# Patient Record
Sex: FEMALE | Race: WHITE | NOT HISPANIC OR LATINO | Employment: OTHER | URBAN - NONMETROPOLITAN AREA
[De-identification: names, ages, dates, MRNs, and addresses within clinical notes are randomized per-mention and may not be internally consistent; named-entity substitution may affect disease eponyms.]

---

## 2018-01-15 ENCOUNTER — OFFICE VISIT (OUTPATIENT)
Dept: PULMONOLOGY | Facility: CLINIC | Age: 48
End: 2018-01-15

## 2018-01-15 ENCOUNTER — HOSPITAL ENCOUNTER (OUTPATIENT)
Dept: PULMONOLOGY | Facility: HOSPITAL | Age: 48
Discharge: HOME | End: 2018-01-15
Attending: INTERNAL MEDICINE | Admitting: INTERNAL MEDICINE

## 2018-01-15 ENCOUNTER — LAB (OUTPATIENT)
Dept: LAB | Facility: HOSPITAL | Age: 48
End: 2018-01-15
Attending: INTERNAL MEDICINE

## 2018-01-15 VITALS
WEIGHT: 247 LBS | DIASTOLIC BLOOD PRESSURE: 82 MMHG | HEART RATE: 68 BPM | RESPIRATION RATE: 18 BRPM | SYSTOLIC BLOOD PRESSURE: 124 MMHG | OXYGEN SATURATION: 94 % | HEIGHT: 62 IN | BODY MASS INDEX: 45.45 KG/M2

## 2018-01-15 DIAGNOSIS — R06.83 SNORING: ICD-10-CM

## 2018-01-15 DIAGNOSIS — J30.89 OTHER ALLERGIC RHINITIS: ICD-10-CM

## 2018-01-15 DIAGNOSIS — G47.19 EXCESSIVE DAYTIME SLEEPINESS: ICD-10-CM

## 2018-01-15 DIAGNOSIS — G47.33 OBSTRUCTIVE SLEEP APNEA: ICD-10-CM

## 2018-01-15 DIAGNOSIS — R06.02 SHORTNESS OF BREATH: ICD-10-CM

## 2018-01-15 DIAGNOSIS — R05.3 COUGH, PERSISTENT: Primary | ICD-10-CM

## 2018-01-15 DIAGNOSIS — J45.50 SEVERE PERSISTENT ASTHMA WITHOUT COMPLICATION: ICD-10-CM

## 2018-01-15 PROCEDURE — 94060 EVALUATION OF WHEEZING: CPT

## 2018-01-15 PROCEDURE — 36415 COLL VENOUS BLD VENIPUNCTURE: CPT

## 2018-01-15 PROCEDURE — 94729 DIFFUSING CAPACITY: CPT | Performed by: INTERNAL MEDICINE

## 2018-01-15 PROCEDURE — 94060 EVALUATION OF WHEEZING: CPT | Performed by: INTERNAL MEDICINE

## 2018-01-15 PROCEDURE — 94640 AIRWAY INHALATION TREATMENT: CPT

## 2018-01-15 PROCEDURE — 94729 DIFFUSING CAPACITY: CPT

## 2018-01-15 PROCEDURE — 94726 PLETHYSMOGRAPHY LUNG VOLUMES: CPT | Performed by: INTERNAL MEDICINE

## 2018-01-15 PROCEDURE — 99245 OFF/OP CONSLTJ NEW/EST HI 55: CPT | Performed by: INTERNAL MEDICINE

## 2018-01-15 PROCEDURE — 94726 PLETHYSMOGRAPHY LUNG VOLUMES: CPT

## 2018-01-15 RX ORDER — FLUNISOLIDE 0.25 MG/ML
1 SOLUTION NASAL EVERY 12 HOURS
Qty: 1 BOTTLE | Refills: 5 | Status: SHIPPED | OUTPATIENT
Start: 2018-01-15 | End: 2018-07-16 | Stop reason: SDUPTHER

## 2018-01-15 RX ORDER — ESTRADIOL 0.05 MG/D
FILM, EXTENDED RELEASE TRANSDERMAL
Refills: 2 | COMMUNITY
Start: 2017-10-20

## 2018-01-15 RX ORDER — BROMPHENIRAMINE MALEATE, PSEUDOEPHEDRINE HYDROCHLORIDE, AND DEXTROMETHORPHAN HYDROBROMIDE 2; 30; 10 MG/5ML; MG/5ML; MG/5ML
SYRUP ORAL
Refills: 0 | COMMUNITY
Start: 2017-10-12 | End: 2019-06-13

## 2018-01-15 RX ORDER — FLUCONAZOLE 150 MG/1
150 TABLET ORAL TAKE AS DIRECTED
Refills: 0 | COMMUNITY
Start: 2017-12-11

## 2018-01-15 RX ORDER — DEXAMETHASONE 4 MG/1
TABLET ORAL
Refills: 0 | COMMUNITY
Start: 2017-10-12 | End: 2018-01-15

## 2018-01-15 RX ORDER — METHYLPREDNISOLONE 4 MG/1
TABLET ORAL
Refills: 0 | COMMUNITY
Start: 2017-10-12 | End: 2019-06-13

## 2018-01-15 RX ORDER — ALBUTEROL SULFATE 90 UG/1
AEROSOL, METERED RESPIRATORY (INHALATION)
Refills: 0 | COMMUNITY
Start: 2017-10-12 | End: 2018-07-16 | Stop reason: SDUPTHER

## 2018-01-15 RX ORDER — NYSTATIN 100000 [USP'U]/G
POWDER TOPICAL
Refills: 0 | COMMUNITY
Start: 2017-12-11 | End: 2019-06-13

## 2018-01-15 RX ORDER — ALBUTEROL SULFATE 2.5 MG/3ML
2.5 SOLUTION RESPIRATORY (INHALATION) ONCE
Status: COMPLETED | OUTPATIENT
Start: 2018-01-15 | End: 2018-01-15

## 2018-01-15 RX ORDER — TOBRAMYCIN AND DEXAMETHASONE 3; 1 MG/ML; MG/ML
SUSPENSION/ DROPS OPHTHALMIC
Refills: 0 | COMMUNITY
Start: 2017-10-11 | End: 2019-06-13

## 2018-01-15 RX ADMIN — ALBUTEROL SULFATE 2.5 MG: 2.5 SOLUTION RESPIRATORY (INHALATION) at 11:35

## 2018-01-15 NOTE — PROGRESS NOTES
CONSULT NOTE    Requested by:   JEANETTE Dickerson      Chief Complaint   Patient presents with   • Consult   • Cough   • Shortness of Breath       Subjective   Madalyn Hernandez is a 47 y.o. female.     History of Present Illness   Patient comes in today for consultation because of chronic cough for the past 2 years.  The patient says that about 2-1/2 years ago or so she developed an episode of pneumonia and after that she developed a cough which has persisted since then.    The patient says that although she is a nonsmoker herself, her parents smoked throughout her childhood.  She also reports significant improvement in symptoms when she is placed on steroids for a short time.  The patient says that cold definitely worsens her symptoms and also strong smells worse in it.    She has a dog at home but denies any allergies to it and actually has had that same doctor more than 40 years now.    The patient uses Ventolin once or twice a day and feels that they help.    Patient also complains of runny nose and dribbling in the back of the throat for the past few weeks. This has been sometimes associated with seasonal variation.    The patient also reports joint aches in the morning and occasional facial rash although there seems to be no relationship with exposure to the sun.    The patient is complaining of daytime sleepiness, tiredness and fatigue.  She is also complaining of occasional headaches in the morning.  The patient says that she has a tendency to fall asleep while watching TV.  She has been told by her friend's mother that her symptoms were consistent with sleep apnea as the friend's mother also had sleep apnea and when she stayed with the patient, she felt that her symptoms were highly suggestive of it.    She reports positive family history of COPD in her aunts and grandfather.    The following portions of the patient's history were reviewed and updated as appropriate: allergies, current medications, past  "family history, past medical history, past social history, past surgical history and problem list.    Review of Systems   Constitutional: Positive for fatigue. Negative for chills and fever.   HENT: Positive for postnasal drip and rhinorrhea. Negative for sinus pain, sinus pressure, sneezing and sore throat.    Respiratory: Positive for cough, chest tightness, shortness of breath and wheezing.    Cardiovascular: Positive for chest pain and leg swelling. Negative for palpitations.   Psychiatric/Behavioral: Positive for sleep disturbance.   All other systems reviewed and are negative.      History reviewed. No pertinent past medical history.    Social History   Substance Use Topics   • Smoking status: Never Smoker   • Smokeless tobacco: Never Used   • Alcohol use No         Objective   Visit Vitals   • /82   • Pulse 68   • Resp 18   • Ht 157.5 cm (62\")   • Wt 112 kg (247 lb)   • SpO2 94%   • BMI 45.18 kg/m2       Physical Exam   Constitutional: She is oriented to person, place, and time. She appears well-developed and well-nourished.   HENT:   Head: Atraumatic.   Sinuses were non tender on palpation.  Nostril showed moderate erythema.  Oropharynx was cobblestoned   Dentures  Paranasal rash noted   Eyes: EOM are normal. Pupils are equal, round, and reactive to light.   Neck: No JVD present. No tracheal deviation present. No thyromegaly present.   Increased adipose tissue.    Cardiovascular: Normal rate and regular rhythm.    Pulmonary/Chest: Effort normal and breath sounds normal. No respiratory distress. She has no wheezes.   Musculoskeletal: Normal range of motion. She exhibits no edema.   Neurological: She is alert and oriented to person, place, and time.   Skin: Skin is warm and dry.   Psychiatric: She has a normal mood and affect. Her behavior is normal.   Vitals reviewed.        Assessment/Plan   Madalyn was seen today for consult, cough and shortness of breath.    Diagnoses and all orders for this " visit:    Cough, persistent    Severe persistent asthma without complication  -     Pulmonary Function Test    Shortness of breath  -     Pulmonary Function Test  -     Nitric Oxide  -     Pulmonary Function Test    Obstructive sleep apnea    Excessive daytime sleepiness    Snoring    Other allergic rhinitis  -     IgE; Future  -     Allergens, Zone 8; Future    Other orders  -     mometasone-formoterol (DULERA 200) 200-5 MCG/ACT inhaler; Inhale 2 puffs 2 (Two) Times a Day. Rinse mouth with water after use.  -     flunisolide (NASALIDE) 25 MCG/ACT (0.025%) solution nasal spray; Inhale 1 spray Every 12 (Twelve) Hours.         Return in about 6 weeks (around 2/26/2018) for Recheck, Labs, Give pt sleep questionairosiel, For Rasheeda.    DISCUSSION(if any):  I have reviewed the patient's imaging studies and shared them with her.  Her last chest x-ray from 2015 did not show any acute pulmonary disease.    I have also reviewed her primary care provider's office note that mentions chronic cough.  It also mentions wheezing and nonsmoking status.  It also significantly mentions relief with steroids.     Her FeNO was 9.    ===========================  ===========================    I had a detailed discussion with the patient regarding her symptoms which are very suggestive of cough variant asthma.    She was started on Dulera 2 puffs twice daily with instructions to rinse his mouth after water    The patient may be started on Singulair    Patient will be started on nasal spray for symptoms which are definitely consistent with allergic rhinitis.     I have ordered IgE/RAST panel.    Other contributing factors may also need to be treated and this will be discussed with the patient, if and when applicable    The patient was advised to maintain a log of the use of rescue inhaler    She was also advised to keep a close eye on peak flow readings and to maintain record of any significant changes in it.    Sleep questionnaire was  provided to the patient    The pathophysiology of sleep apnea was discussed, with the patient.     Based on the sleep questionnaire, I will definitely consider ordering a sleep study.    Patient was educated on good sleep hygiene measures and voiced understanding of the same.     Patient was given reading material.        Dictated utilizing Dragon dictation.    This document was electronically signed by Gianluca Bishop MD January 15, 2018  10:28 AM

## 2018-03-12 ENCOUNTER — OFFICE VISIT (OUTPATIENT)
Dept: PULMONOLOGY | Facility: CLINIC | Age: 48
End: 2018-03-12

## 2018-03-12 VITALS
BODY MASS INDEX: 46.01 KG/M2 | HEART RATE: 77 BPM | OXYGEN SATURATION: 95 % | SYSTOLIC BLOOD PRESSURE: 115 MMHG | HEIGHT: 62 IN | RESPIRATION RATE: 17 BRPM | DIASTOLIC BLOOD PRESSURE: 80 MMHG | WEIGHT: 250 LBS

## 2018-03-12 DIAGNOSIS — R06.02 SHORTNESS OF BREATH: ICD-10-CM

## 2018-03-12 DIAGNOSIS — G47.33 OBSTRUCTIVE SLEEP APNEA: ICD-10-CM

## 2018-03-12 DIAGNOSIS — J30.89 OTHER ALLERGIC RHINITIS: ICD-10-CM

## 2018-03-12 DIAGNOSIS — R05.3 COUGH, PERSISTENT: Primary | ICD-10-CM

## 2018-03-12 DIAGNOSIS — J45.50 SEVERE PERSISTENT ASTHMA WITHOUT COMPLICATION: ICD-10-CM

## 2018-03-12 PROCEDURE — 99214 OFFICE O/P EST MOD 30 MIN: CPT | Performed by: NURSE PRACTITIONER

## 2018-03-12 RX ORDER — MONTELUKAST SODIUM 10 MG/1
10 TABLET ORAL NIGHTLY
Qty: 30 TABLET | Refills: 5 | Status: SHIPPED | OUTPATIENT
Start: 2018-03-12 | End: 2018-07-16 | Stop reason: SDUPTHER

## 2018-03-12 NOTE — PROGRESS NOTES
"Chief Complaint   Patient presents with   • Follow-up     Cough, persistent         Subjective   Madalyn Hernandez is a 47 y.o. female.     History of Present Illness   The patient comes in today for follow-up of shortness of breath, persistent cough, and obstructive sleep apnea.    She has been using Dulera twice a day and she feels like this medication works better than the previous medication.  She is using her rescue inhaler 2-3 times a day depending on her activity.    She does report having one asthma attack when she was just simply sitting the other day and this is the first time that has ever happened.  She used her rescue inhaler and symptoms resolved in about 45 minutes.    She did complete a sleep questionnaire but she left it at home she will try to get that back to us as soon as possible.  She does complain of awakening multiple times in the middle of the night and also having trouble going to sleep at times.  She states she did not sleep at all last night.    She attempted to have her lab work completed however the technician was not able to get enough blood.  The patient will return in a second attempt to have her blood work completed.     She is using Nasalide once a day and is still complaining of postnasal drainage.      The following portions of the patient's history were reviewed and updated as appropriate: allergies, current medications, past family history, past medical history, past social history and past surgical history.    Review of Systems   Constitutional: Positive for fatigue. Negative for chills and fever.   HENT: Positive for sneezing and voice change. Negative for rhinorrhea and sore throat.    Respiratory: Positive for cough and shortness of breath. Negative for chest tightness and wheezing.        Objective   Visit Vitals  /80   Pulse 77   Resp 17   Ht 157.5 cm (62.01\")   Wt 113 kg (250 lb)   SpO2 95%   BMI 45.71 kg/m²     Physical Exam   Constitutional: She is oriented to " person, place, and time. She appears well-developed and well-nourished.   HENT:   Head: Normocephalic and atraumatic.   Crowded oropharynx.    Eyes: EOM are normal.   Neck: Neck supple.   Cardiovascular: Normal rate and regular rhythm.    Pulmonary/Chest: Effort normal and breath sounds normal. No respiratory distress. She has no wheezes.   Musculoskeletal: She exhibits no edema.   Neurological: She is alert and oriented to person, place, and time.   Skin: Skin is warm and dry.   Psychiatric: She has a normal mood and affect.   Vitals reviewed.          Assessment/Plan   Madalyn was seen today for follow-up.    Diagnoses and all orders for this visit:    Cough, persistent    Severe persistent asthma without complication  -     IgE; Future  -     Allergens, Zone 8; Future    Shortness of breath    Obstructive sleep apnea    Other allergic rhinitis    Other orders  -     montelukast (SINGULAIR) 10 MG tablet; Take 1 tablet by mouth Every Night.           Return in about 4 months (around 7/12/2018) for Recheck, For Dr. Bishop.    DISCUSSION (if any):  I will reorder the labs for IgE/RAST.    She should continue Dulera twice a day.  I have added Singulair.    She should try to use Nasalide twice a day if it is tolerated.    I have asked her to milliliter drop off the sleep questionnaire when she is able.     I reviewed her PFT from January which shows no obstruction, no restriction or air trapping suggested and a mild decreased diffusion capacity.       Dictated utilizing Dragon dictation.    This document was electronically signed by SHERYL Moseley March 12, 2018  2:04 PM

## 2018-07-10 ENCOUNTER — LAB (OUTPATIENT)
Dept: LAB | Facility: HOSPITAL | Age: 48
End: 2018-07-10

## 2018-07-10 DIAGNOSIS — J45.50 SEVERE PERSISTENT ASTHMA WITHOUT COMPLICATION: ICD-10-CM

## 2018-07-10 PROCEDURE — 82785 ASSAY OF IGE: CPT

## 2018-07-10 PROCEDURE — 86003 ALLG SPEC IGE CRUDE XTRC EA: CPT

## 2018-07-10 PROCEDURE — 36415 COLL VENOUS BLD VENIPUNCTURE: CPT

## 2018-07-12 LAB — TOTAL IGE SMQN RAST: 25 IU/ML (ref 0–100)

## 2018-07-16 ENCOUNTER — OFFICE VISIT (OUTPATIENT)
Dept: PULMONOLOGY | Facility: CLINIC | Age: 48
End: 2018-07-16

## 2018-07-16 VITALS
OXYGEN SATURATION: 98 % | WEIGHT: 250 LBS | BODY MASS INDEX: 46.01 KG/M2 | HEART RATE: 77 BPM | RESPIRATION RATE: 16 BRPM | HEIGHT: 62 IN | DIASTOLIC BLOOD PRESSURE: 76 MMHG | SYSTOLIC BLOOD PRESSURE: 128 MMHG

## 2018-07-16 DIAGNOSIS — G47.33 OBSTRUCTIVE SLEEP APNEA: ICD-10-CM

## 2018-07-16 DIAGNOSIS — R06.83 SNORING: ICD-10-CM

## 2018-07-16 DIAGNOSIS — R06.02 SHORTNESS OF BREATH: Primary | ICD-10-CM

## 2018-07-16 DIAGNOSIS — G47.19 EXCESSIVE DAYTIME SLEEPINESS: ICD-10-CM

## 2018-07-16 DIAGNOSIS — J30.89 OTHER ALLERGIC RHINITIS: ICD-10-CM

## 2018-07-16 DIAGNOSIS — E66.01 MORBID OBESITY, UNSPECIFIED OBESITY TYPE (HCC): ICD-10-CM

## 2018-07-16 DIAGNOSIS — K21.9 GASTROESOPHAGEAL REFLUX DISEASE, ESOPHAGITIS PRESENCE NOT SPECIFIED: ICD-10-CM

## 2018-07-16 DIAGNOSIS — J45.50 SEVERE PERSISTENT ASTHMA WITHOUT COMPLICATION: ICD-10-CM

## 2018-07-16 PROCEDURE — 99214 OFFICE O/P EST MOD 30 MIN: CPT | Performed by: INTERNAL MEDICINE

## 2018-07-16 PROCEDURE — 95012 NITRIC OXIDE EXP GAS DETER: CPT | Performed by: INTERNAL MEDICINE

## 2018-07-16 RX ORDER — FLUNISOLIDE 0.25 MG/ML
1 SOLUTION NASAL EVERY 12 HOURS
Qty: 1 BOTTLE | Refills: 5 | Status: SHIPPED | OUTPATIENT
Start: 2018-07-16 | End: 2019-05-07 | Stop reason: SDUPTHER

## 2018-07-16 RX ORDER — RANITIDINE 150 MG/1
150 TABLET ORAL 2 TIMES DAILY
Qty: 60 TABLET | Refills: 1 | Status: SHIPPED | OUTPATIENT
Start: 2018-07-16

## 2018-07-16 RX ORDER — ALBUTEROL SULFATE 90 UG/1
2 AEROSOL, METERED RESPIRATORY (INHALATION) EVERY 6 HOURS PRN
Qty: 1 INHALER | Refills: 5 | Status: SHIPPED | OUTPATIENT
Start: 2018-07-16 | End: 2019-05-07 | Stop reason: SDUPTHER

## 2018-07-16 RX ORDER — AZELASTINE 1 MG/ML
1 SPRAY, METERED NASAL 2 TIMES DAILY PRN
Qty: 1 EACH | Refills: 5 | Status: SHIPPED | OUTPATIENT
Start: 2018-07-16 | End: 2019-01-09 | Stop reason: SDUPTHER

## 2018-07-16 RX ORDER — MONTELUKAST SODIUM 10 MG/1
10 TABLET ORAL NIGHTLY
Qty: 30 TABLET | Refills: 5 | Status: SHIPPED | OUTPATIENT
Start: 2018-07-16 | End: 2019-01-09 | Stop reason: SDUPTHER

## 2018-07-16 NOTE — PROGRESS NOTES
"Chief Complaint   Patient presents with   • Follow-up     COUGH, PERSISTENT        Subjective   Madalyn Hernandez is a 47 y.o. female.     History of Present Illness   Patient comes in today to follow-up on asthma, allergic rhinitis and symptoms of daytime sleepiness and tiredness.    The patient says that she continues to have occasional symptoms of worsening asthma although she denied using Ventolin only twice a titration 2 weeks.    Despite using Nasalide, she's continuing to have runny nose and dribbling in the back of her throat.    The patient wakes up in the morning extremely tired.  The patient says that she snores loudly and wakes up multiple times at night gasping for breath.  She also reports dry mouth in the morning.    The patient says that despite sleeping 10-12 hours a night, she remains exhausted all day long.  She does have a tendency to fall asleep while watching TV or reading a book.    She denies any known family history of sleep apnea.  She drinks 6-7 cups of caffeinated drinks per day.    She is also complaining of very mild occasional heartburn.  She actually has a cough in the morning and the cough also wakes her up throughout the night on a regular basis.    The following portions of the patient's history were reviewed and updated as appropriate: allergies, current medications, past family history, past medical history, past social history and past surgical history.    Review of Systems   HENT: Negative for sinus pressure, sneezing and sore throat.    Respiratory: Positive for cough and shortness of breath. Negative for wheezing.        Objective   Visit Vitals  /76   Pulse 77   Resp 16   Ht 157.5 cm (62.01\")   Wt 113 kg (250 lb)   SpO2 98%   BMI 45.71 kg/m²       Physical Exam   Constitutional: She is oriented to person, place, and time. She appears well-developed and well-nourished.   HENT:   Dentures  Nostril showed moderate erythema.  Oropharynx was cobblestoned & crowded.   Eyes: EOM " are normal. Pupils are equal, round, and reactive to light.   Neck:   Increased adipose tissue.    Cardiovascular: Normal rate and regular rhythm.    Pulmonary/Chest: Effort normal and breath sounds normal. No respiratory distress. She has no wheezes. She has no rales.   Musculoskeletal:   Gait was normal.   Neurological: She is alert and oriented to person, place, and time.   Psychiatric: She has a normal mood and affect.   Vitals reviewed.        Assessment/Plan   Madalyn was seen today for follow-up.    Diagnoses and all orders for this visit:    Shortness of breath  -     Nitric Oxide  -     Peak Flow    Severe persistent asthma without complication  -     Nitric Oxide  -     Peak Flow    Other allergic rhinitis    Obstructive sleep apnea  -     Home Sleep Study; Future    Snoring  -     Home Sleep Study; Future    Excessive daytime sleepiness  -     Home Sleep Study; Future    Morbid obesity, unspecified obesity type (CMS/HCC)  -     Home Sleep Study; Future    Gastroesophageal reflux disease, esophagitis presence not specified    Other orders  -     raNITIdine (ZANTAC) 150 MG tablet; Take 1 tablet by mouth 2 (Two) Times a Day.  -     azelastine (ASTELIN) 0.1 % nasal spray; 1 spray into each nostril 2 (Two) Times a Day As Needed for Rhinitis or Allergies. Use in each nostril as directed  -     mometasone-formoterol (DULERA 200) 200-5 MCG/ACT inhaler; Inhale 2 puffs 2 (Two) Times a Day. Rinse mouth with water after use.  -     montelukast (SINGULAIR) 10 MG tablet; Take 1 tablet by mouth Every Night.  -     flunisolide (NASALIDE) 25 MCG/ACT (0.025%) solution nasal spray; Inhale 1 spray Every 12 (Twelve) Hours.  -     VENTOLIN  (90 Base) MCG/ACT inhaler; Inhale 2 puffs Every 6 (Six) Hours As Needed for Wheezing.         Return in about 3 months (around 10/16/2018) for Recheck, Sleep study, For Rasheeda.    DISCUSSION (if any):  FeNO level was 14 today.    Peak flow was 320 LPM.     IgE level was 25.  The RAST  panel is pending?    I've asked the patient to start using Nasalide twice a day, as her symptoms continue to suggest poorly controlled allergic rhinitis.    I will also have azelastine, for when necessary purposes.    We will consider CBC upon follow-up visit, to assess for eosinophil levels.    He was advised to continue all her current medications.    Refills were provided.    Patient was advised to use rescue inhaler for when necessary purposes    Patient was also advised to keep a log of the use of rescue inhaler.    The pathophysiology of sleep apnea was discussed, with the patient.     We will encourage the patient to schedule the sleep study soon.     The patient was made aware of the limitation of the home sleep study, whereby it may underestimate the true AHI and also carries a low sensitivity.  The patient was also told that even if the home sleep study is negative, we may suggest an in lab sleep study to completely and definitively rule out/in sleep apnea.  The patient has understood.  This was communicated to the patient, in case home study is to be requested.    The patient is agreeable to try CPAP/BiPAP, if needed.     Patient was educated on good sleep hygiene measures and voiced understanding of the same.     Patient was given reading material regarding sleep apnea    Patient was counseled regarding weight loss.     In her situation, the weight loss would also help her asthma symptoms.    Silent aspiration and acid reflux can also cause cough and given the fact that it is worse at night, have started her on Zantac 150 milligrams twice daily for the next 1 month on a trial basis.    Side effects were discussed.    Dictated utilizing Dragon dictation.    This document was electronically signed by Gianluca Bishop MD July 16, 2018  12:01 PM

## 2018-07-21 LAB
A ALTERNATA IGE QN: <0.1 KU/L
A FUMIGATUS IGE QN: <0.1 KU/L
AMER ROACH IGE QN: <0.1 KU/L
BAHIA GRASS IGE QN: <0.1 KU/L
BERMUDA GRASS IGE QN: <0.1 KU/L
BOXELDER IGE QN: <0.1 KU/L
C HERBARUM IGE QN: <0.1 KU/L
CAT DANDER IGG QN: <0.1 KU/L
CMN PIGWEED IGE QN: <0.1 KU/L
COMMON RAGWEED IGE QN: <0.1 KU/L
CONV CLASS DESCRIPTION: NORMAL
D FARINAE IGE QN: <0.1 KU/L
D PTERONYSS IGE QN: <0.1 KU/L
DOG DANDER IGE QN: <0.1 KU/L
ENGL PLANTAIN IGE QN: <0.1 KU/L
HAZELNUT POLN IGE QN: <0.1 KU/L
JOHNSON GRASS IGE QN: <0.1 KU/L
KENT BLUE GRASS IGE QN: <0.1 KU/L
M RACEMOSUS IGE QN: <0.1 KU/L
MT JUNIPER IGE QN: <0.1 KU/L
MUGWORT IGE QN: <0.1 KU/L
NETTLE IGE QN: <0.1 KU/L
P NOTATUM IGE QN: <0.1 KU/L
S BOTRYOSUM IGE QN: <0.1 KU/L
SHEEP SORREL IGE QN: <0.1 KU/L
SWEET GUM IGE QN: <0.1 KU/L
T011-IGE MAPLE LEAF SYCAMORE: <0.1 KU/L
WHITE ELM IGE QN: <0.1 KU/L
WHITE HICKORY IGE QN: <0.1 KU/L
WHITE MULBERRY IGE QN: <0.1 KU/L
WHITE OAK IGE QN: <0.1 KU/L

## 2018-07-30 ENCOUNTER — HOSPITAL ENCOUNTER (OUTPATIENT)
Dept: SLEEP MEDICINE | Facility: HOSPITAL | Age: 48
Setting detail: THERAPIES SERIES
End: 2018-07-30
Attending: INTERNAL MEDICINE

## 2018-07-30 DIAGNOSIS — R06.83 SNORING: ICD-10-CM

## 2018-07-30 DIAGNOSIS — G47.33 OBSTRUCTIVE SLEEP APNEA: ICD-10-CM

## 2018-07-30 DIAGNOSIS — G47.19 EXCESSIVE DAYTIME SLEEPINESS: ICD-10-CM

## 2018-07-30 DIAGNOSIS — E66.01 MORBID OBESITY, UNSPECIFIED OBESITY TYPE (HCC): ICD-10-CM

## 2018-07-30 PROCEDURE — 95806 SLEEP STUDY UNATT&RESP EFFT: CPT

## 2018-07-31 PROCEDURE — 95806 SLEEP STUDY UNATT&RESP EFFT: CPT | Performed by: INTERNAL MEDICINE

## 2018-08-01 DIAGNOSIS — G47.33 OSA (OBSTRUCTIVE SLEEP APNEA): Primary | ICD-10-CM

## 2018-09-10 RX ORDER — RANITIDINE 150 MG/1
TABLET ORAL
Qty: 60 TABLET | Refills: 0 | OUTPATIENT
Start: 2018-09-10

## 2018-10-16 ENCOUNTER — OFFICE VISIT (OUTPATIENT)
Dept: PULMONOLOGY | Facility: CLINIC | Age: 48
End: 2018-10-16

## 2018-10-16 VITALS
DIASTOLIC BLOOD PRESSURE: 68 MMHG | BODY MASS INDEX: 45.45 KG/M2 | SYSTOLIC BLOOD PRESSURE: 110 MMHG | OXYGEN SATURATION: 99 % | HEIGHT: 62 IN | RESPIRATION RATE: 18 BRPM | HEART RATE: 50 BPM | WEIGHT: 247 LBS

## 2018-10-16 DIAGNOSIS — R06.02 SHORTNESS OF BREATH: ICD-10-CM

## 2018-10-16 DIAGNOSIS — J45.50 SEVERE PERSISTENT ASTHMA WITHOUT COMPLICATION: ICD-10-CM

## 2018-10-16 DIAGNOSIS — J30.89 OTHER ALLERGIC RHINITIS: ICD-10-CM

## 2018-10-16 DIAGNOSIS — G47.33 OSA (OBSTRUCTIVE SLEEP APNEA): Primary | ICD-10-CM

## 2018-10-16 PROCEDURE — 99214 OFFICE O/P EST MOD 30 MIN: CPT | Performed by: NURSE PRACTITIONER

## 2018-10-16 RX ORDER — METRONIDAZOLE 10 MG/G
GEL TOPICAL
Refills: 2 | COMMUNITY
Start: 2018-08-28 | End: 2019-06-13

## 2018-10-16 RX ORDER — ORPHENADRINE CITRATE 100 MG/1
TABLET, EXTENDED RELEASE ORAL
Refills: 0 | COMMUNITY
Start: 2018-08-14 | End: 2019-06-13

## 2018-10-16 RX ORDER — PREDNISONE 20 MG/1
TABLET ORAL
Refills: 0 | COMMUNITY
Start: 2018-08-14 | End: 2019-06-13

## 2018-10-16 RX ORDER — INDOMETHACIN 25 MG/1
CAPSULE ORAL
Refills: 0 | COMMUNITY
Start: 2018-08-14 | End: 2019-06-13

## 2018-10-16 RX ORDER — SIMVASTATIN 10 MG
TABLET ORAL
Refills: 2 | COMMUNITY
Start: 2018-10-07

## 2018-10-16 RX ORDER — OXYBUTYNIN CHLORIDE 5 MG/1
5 TABLET, EXTENDED RELEASE ORAL DAILY
Refills: 2 | COMMUNITY
Start: 2018-09-26

## 2018-10-16 NOTE — PROGRESS NOTES
"Chief Complaint   Patient presents with   • Follow-up   • Sleeping Problem   • Shortness of Breath         Subjective   Madalyn Hernandez is a 48 y.o. female.     History of Present Illness   Patient comes in today to follow-up on asthma, sleep apnea and allergic rhinitis.    The patient says that her cough is noted in better and she coughs all day and all night.  It is occasionally productive of clear sputum but mostly it is extremely dry.  The patient occasionally has coughed to the point she vomited.    She is using Dulera twice a day and Singulair at night.    She does report burning in her chest and throat.  She is on ranitidine 150 mg twice a day.    The patient is using Nasalide twice a day and Astelin twice a day as well.  She reports only minimal improvement in her symptoms.    She actually denies any significant shortness of breath.    She says that she is using a CPAP device every night but is struggling with the mask which occasionally causes significant leak and discomfort throughout the night because of the leak.  She seems to have informed him he company of the same.    The following portions of the patient's history were reviewed and updated as appropriate: allergies, current medications, past family history, past medical history, past social history and past surgical history.    Review of Systems   Constitutional: Negative for chills and fever.   HENT: Positive for rhinorrhea and sore throat. Negative for sinus pressure.    Respiratory: Positive for cough and shortness of breath.    Psychiatric/Behavioral: Positive for sleep disturbance.       Objective   Visit Vitals  /68   Pulse 50   Resp 18   Ht 157.5 cm (62.01\")   Wt 112 kg (247 lb)   SpO2 99%   BMI 45.17 kg/m²     Physical Exam   Constitutional: She is oriented to person, place, and time. She appears well-developed and well-nourished.   HENT:   Head: Normocephalic and atraumatic.   Crowded oropharynx.   Eyes: EOM are normal.   Neck: Neck " supple.   Increased adipose tissue.   Cardiovascular: Normal rate and regular rhythm.    Pulmonary/Chest: Effort normal and breath sounds normal. No respiratory distress. She has no wheezes.   Musculoskeletal: She exhibits no edema.   Gait normal.   Neurological: She is alert and oriented to person, place, and time.   Skin: Skin is warm and dry.   Psychiatric: She has a normal mood and affect.   Vitals reviewed.          Assessment/Plan   Madalyn was seen today for follow-up, sleeping problem and shortness of breath.    Diagnoses and all orders for this visit:    DIANNA (obstructive sleep apnea)  -     BIPAP / CPAP Adjustment    Severe persistent asthma without complication  -     CBC Auto Differential; Future    Shortness of breath    Other allergic rhinitis    Other orders  -     Tiotropium Bromide Monohydrate (SPIRIVA RESPIMAT) 1.25 MCG/ACT aerosol solution inhaler; Inhale 2 puffs Daily.           Return in about 12 weeks (around 1/8/2019) for Recheck, For Dr. Bishop.    DISCUSSION (if any):  I reviewed the patient's last RAST panel which was unremarkable.  Her IgE level was 25.    I have ordered CBC to assess for absolute eosinophil count.    Since her asthma is poorly controlled, and is definitely severe persistent, I have added Spiriva.    I'm asked her to continue on her current medications.    I have asked the [employer] company to provide her with a different mask as it seems that her current mask is not fitting her properly.  There is definitely a leak involved it causes some discomfort to her.    The patient's CPAP device will also be need to look at as she insists that she is using it every night but the compliance data did not reflect that.    Based on information made available from the [employer] and after assessing her response to hopefully a different mask, further recommendations will be made.    Have asked her to come back in follow-up with Dr. Bishop.      Dictated utilizing Dragon dictation.    This document  was electronically signed by SHERYL Moseley October 16, 2018  10:33 AM

## 2019-01-08 ENCOUNTER — OFFICE VISIT (OUTPATIENT)
Dept: PULMONOLOGY | Facility: CLINIC | Age: 49
End: 2019-01-08

## 2019-01-08 VITALS
BODY MASS INDEX: 46.93 KG/M2 | OXYGEN SATURATION: 96 % | RESPIRATION RATE: 18 BRPM | HEART RATE: 78 BPM | WEIGHT: 255 LBS | DIASTOLIC BLOOD PRESSURE: 74 MMHG | HEIGHT: 62 IN | SYSTOLIC BLOOD PRESSURE: 120 MMHG

## 2019-01-08 DIAGNOSIS — E66.01 MORBID OBESITY, UNSPECIFIED OBESITY TYPE (HCC): ICD-10-CM

## 2019-01-08 DIAGNOSIS — R06.02 SHORTNESS OF BREATH: ICD-10-CM

## 2019-01-08 DIAGNOSIS — J45.50 SEVERE PERSISTENT ASTHMA WITHOUT COMPLICATION: ICD-10-CM

## 2019-01-08 DIAGNOSIS — G47.33 OSA (OBSTRUCTIVE SLEEP APNEA): Primary | ICD-10-CM

## 2019-01-08 DIAGNOSIS — J30.89 OTHER ALLERGIC RHINITIS: ICD-10-CM

## 2019-01-08 PROCEDURE — 99214 OFFICE O/P EST MOD 30 MIN: CPT | Performed by: INTERNAL MEDICINE

## 2019-01-08 RX ORDER — MELOXICAM 7.5 MG/1
TABLET ORAL
Refills: 5 | COMMUNITY
Start: 2018-12-13

## 2019-01-08 NOTE — PROGRESS NOTES
"Chief Complaint   Patient presents with   • Follow-up   • Shortness of Breath         Subjective   Madalyn Hernandez is a 48 y.o. female.     History of Present Illness   Patient comes today for follow up of shortness of breath, DIANNA, Allergic Rhinitis and asthma.     Symptoms have been stable since the last clinic visit. Patient reports no recent exacerbations.     Patient is using medications, as prescribed. Exercise tolerance has also remained stable.     Patient doesn't report any issues with the machine or mask.     Patient says that the compliance with the use of the equipment is good.     Patient says that her symptoms of sleep disturbance & daytime sleepiness have been helped greatly with the use of PAP, as prescribed.     Patient says that she has been using her nasal sprays on a regular basis and describes no significant ongoing issues other than occasional congestion.       The following portions of the patient's history were reviewed and updated as appropriate: allergies, current medications, past family history, past medical history, past social history and past surgical history.    Review of Systems   Constitutional: Negative for chills and fever.   HENT: Negative for sinus pressure and sore throat.    Respiratory: Negative for cough and shortness of breath.    Psychiatric/Behavioral: Negative for sleep disturbance.       Objective   Visit Vitals  /74   Pulse 78   Resp 18   Ht 157.5 cm (62.01\")   Wt 116 kg (255 lb)   SpO2 96%   BMI 46.63 kg/m²       Physical Exam   Constitutional: She is oriented to person, place, and time. She appears well-developed and well-nourished.   HENT:   Head: Atraumatic.   Crowded oropharynx.   Dentures.    Eyes: EOM are normal.   Neck: Neck supple. No JVD present.   Cardiovascular: Normal rate and regular rhythm.   Pulmonary/Chest: Effort normal and breath sounds normal. No respiratory distress. She has no wheezes. She has no rales.   Musculoskeletal:   Gait was normal. "   Neurological: She is alert and oriented to person, place, and time.   Skin: Skin is warm and dry.   Psychiatric: She has a normal mood and affect.   Vitals reviewed.      Assessment/Plan   Madalyn was seen today for follow-up and shortness of breath.    Diagnoses and all orders for this visit:    DIANNA (obstructive sleep apnea)    Severe persistent asthma without complication  -     Pulmonary Function Test; Future  -     Nitric Oxide; Future    Shortness of breath    Other allergic rhinitis    Morbid obesity, unspecified obesity type (CMS/HCC)  -     Ambulatory Referral to Bariatric Surgery           Return in about 4 months (around 5/8/2019) for Recheck, PFT on day of F/Up, FeNO on F/Up, For Rasheeda.    DISCUSSION (if any):  Continue treatment with AutoPAP at a pressure of 6/16, with a full-face mask.    Patient seems to be compliant with PAP device.     Weight loss advised. Will refer to Bariatric Surgery.      Use every night for atleast 4 hours stressed.    Patient's symptoms seem to be under adequate control with the current regimen.    PFTs and FeNO will be obtained upon follow up.     I have made no changes to the medications and discussed with the patient in great detail the need for compliance with medications. Spiriva/Dulera/Singulair and Ventolin.     Side effects of prescribed medications discussed with the patient.    I have discussed asthma action plan with her.    The patient was asked to call this office if the symptoms worsen.    She was advised to continue using her nasal sprays regularly.      Dictated utilizing Dragon dictation.    This document was electronically signed by Gianluca Bishop MD January 8, 2019  12:19 PM

## 2019-01-09 RX ORDER — AZELASTINE 1 MG/ML
SPRAY, METERED NASAL
Qty: 30 ML | Refills: 0 | Status: SHIPPED | OUTPATIENT
Start: 2019-01-09 | End: 2019-02-06 | Stop reason: SDUPTHER

## 2019-01-09 RX ORDER — MONTELUKAST SODIUM 10 MG/1
10 TABLET ORAL NIGHTLY
Qty: 30 TABLET | Refills: 0 | Status: SHIPPED | OUTPATIENT
Start: 2019-01-09 | End: 2019-02-06 | Stop reason: SDUPTHER

## 2019-01-17 RX ORDER — MOMETASONE FUROATE AND FORMOTEROL FUMARATE DIHYDRATE 200; 5 UG/1; UG/1
AEROSOL RESPIRATORY (INHALATION)
Qty: 13 G | Refills: 0 | Status: SHIPPED | OUTPATIENT
Start: 2019-01-17 | End: 2019-02-14 | Stop reason: SDUPTHER

## 2019-02-06 RX ORDER — AZELASTINE 1 MG/ML
SPRAY, METERED NASAL
Qty: 30 ML | Refills: 4 | Status: SHIPPED | OUTPATIENT
Start: 2019-02-06 | End: 2019-07-09 | Stop reason: SDUPTHER

## 2019-02-06 RX ORDER — MONTELUKAST SODIUM 10 MG/1
10 TABLET ORAL NIGHTLY
Qty: 30 TABLET | Refills: 4 | Status: SHIPPED | OUTPATIENT
Start: 2019-02-06 | End: 2019-05-07 | Stop reason: SDUPTHER

## 2019-02-15 RX ORDER — MOMETASONE FUROATE AND FORMOTEROL FUMARATE DIHYDRATE 200; 5 UG/1; UG/1
AEROSOL RESPIRATORY (INHALATION)
Qty: 13 G | Refills: 1 | Status: SHIPPED | OUTPATIENT
Start: 2019-02-15 | End: 2019-04-10 | Stop reason: SDUPTHER

## 2019-04-10 RX ORDER — MOMETASONE FUROATE AND FORMOTEROL FUMARATE DIHYDRATE 200; 5 UG/1; UG/1
AEROSOL RESPIRATORY (INHALATION)
Qty: 13 G | Refills: 0 | Status: SHIPPED | OUTPATIENT
Start: 2019-04-10 | End: 2019-05-07 | Stop reason: SDUPTHER

## 2019-05-07 ENCOUNTER — OFFICE VISIT (OUTPATIENT)
Dept: PULMONOLOGY | Facility: CLINIC | Age: 49
End: 2019-05-07

## 2019-05-07 VITALS
HEIGHT: 62 IN | OXYGEN SATURATION: 98 % | BODY MASS INDEX: 47.84 KG/M2 | RESPIRATION RATE: 16 BRPM | DIASTOLIC BLOOD PRESSURE: 76 MMHG | SYSTOLIC BLOOD PRESSURE: 110 MMHG | WEIGHT: 260 LBS | HEART RATE: 78 BPM

## 2019-05-07 DIAGNOSIS — J45.50 SEVERE PERSISTENT ASTHMA WITHOUT COMPLICATION: ICD-10-CM

## 2019-05-07 DIAGNOSIS — J30.89 OTHER ALLERGIC RHINITIS: ICD-10-CM

## 2019-05-07 DIAGNOSIS — J45.50 SEVERE PERSISTENT ASTHMA WITHOUT COMPLICATION: Primary | ICD-10-CM

## 2019-05-07 DIAGNOSIS — R06.02 SHORTNESS OF BREATH: ICD-10-CM

## 2019-05-07 DIAGNOSIS — G47.33 OSA (OBSTRUCTIVE SLEEP APNEA): ICD-10-CM

## 2019-05-07 PROCEDURE — 94726 PLETHYSMOGRAPHY LUNG VOLUMES: CPT | Performed by: INTERNAL MEDICINE

## 2019-05-07 PROCEDURE — 94060 EVALUATION OF WHEEZING: CPT | Performed by: INTERNAL MEDICINE

## 2019-05-07 PROCEDURE — 94729 DIFFUSING CAPACITY: CPT | Performed by: INTERNAL MEDICINE

## 2019-05-07 PROCEDURE — 99214 OFFICE O/P EST MOD 30 MIN: CPT | Performed by: NURSE PRACTITIONER

## 2019-05-07 RX ORDER — MONTELUKAST SODIUM 10 MG/1
10 TABLET ORAL NIGHTLY
Qty: 30 TABLET | Refills: 4 | Status: SHIPPED | OUTPATIENT
Start: 2019-05-07

## 2019-05-07 RX ORDER — ALBUTEROL SULFATE 90 UG/1
2 AEROSOL, METERED RESPIRATORY (INHALATION) EVERY 6 HOURS PRN
Qty: 1 INHALER | Refills: 5 | Status: SHIPPED | OUTPATIENT
Start: 2019-05-07 | End: 2019-10-17 | Stop reason: SDUPTHER

## 2019-05-07 RX ORDER — FLUNISOLIDE 0.25 MG/ML
1 SOLUTION NASAL EVERY 12 HOURS
Qty: 1 BOTTLE | Refills: 5 | Status: SHIPPED | OUTPATIENT
Start: 2019-05-07

## 2019-05-07 NOTE — PROGRESS NOTES
"Chief Complaint   Patient presents with   • Follow-up   • Sleeping Problem         Subjective   Madalyn Hernandez is a 48 y.o. female.     History of Present Illness   The patient comes in today for follow-up of asthma and obstructive sleep apnea.    She states she was doing well until her insurance stopped paying for Spiriva about 2 months ago.  She has been using her rescue inhaler a lot more since she has not been able to use Spiriva.    She is continue to use Dulera twice a day.  She is also taking Singulair every night.    She is using AutoPap at the current pressure of 6/16.  She is not having any issue using the machine.  She does feel more rested upon awakening in the morning.    The following portions of the patient's history were reviewed and updated as appropriate: allergies, current medications, past family history, past medical history, past social history and past surgical history.    Review of Systems   Constitutional: Negative for chills and fever.   HENT: Negative for sinus pressure and sore throat.    Respiratory: Negative for cough and shortness of breath.    Psychiatric/Behavioral: Negative for sleep disturbance.       Objective   Visit Vitals  /76   Pulse 78   Resp 16   Ht 157.5 cm (62.01\")   Wt 118 kg (260 lb)   SpO2 98%   BMI 47.54 kg/m²     Physical Exam   Constitutional: She is oriented to person, place, and time. She appears well-developed and well-nourished.   HENT:   Head: Normocephalic and atraumatic.   Crowded oropharynx.   Eyes: EOM are normal.   Neck: Neck supple.   Cardiovascular: Normal rate and regular rhythm.   Pulmonary/Chest: Effort normal and breath sounds normal. No respiratory distress. She has no wheezes.   Musculoskeletal: She exhibits no edema.   Gait normal.   Neurological: She is alert and oriented to person, place, and time.   Skin: Skin is warm and dry.   Psychiatric: She has a normal mood and affect.   Vitals reviewed.          Assessment/Plan   Madalyn was seen " today for follow-up and sleeping problem.    Diagnoses and all orders for this visit:    Severe persistent asthma without complication    DIANNA (obstructive sleep apnea)    Shortness of breath    Other allergic rhinitis    Other orders  -     VENTOLIN  (90 Base) MCG/ACT inhaler; Inhale 2 puffs Every 6 (Six) Hours As Needed for Wheezing or Shortness of Air.  -     Tiotropium Bromide Monohydrate (SPIRIVA RESPIMAT) 1.25 MCG/ACT aerosol solution inhaler; Inhale 2 puffs Daily.  -     montelukast (SINGULAIR) 10 MG tablet; Take 1 tablet by mouth Every Night.  -     flunisolide (NASALIDE) 25 MCG/ACT (0.025%) solution nasal spray; Inhale 1 spray Every 12 (Twelve) Hours.  -     mometasone-formoterol (DULERA) 200-5 MCG/ACT inhaler; Inhale 2 puffs 2 (Two) Times a Day. Rinse mouth after each use           Return in about 4 months (around 9/7/2019) for Recheck, For Dr. Bishop.    DISCUSSION (if any):  Unfortunately Spiriva is the only medication in its class that is approved for use and asthma.  When I look to our authorizations it appears our office did preauthorize Spiriva successfully with her insurance company and she was approved to use Spiriva on March 18, 2019.  I have sent a new prescription to the pharmacy for Spiriva 1.25 mcg and I have given the patient a copy of the approved preauthorization for the medication to take to the pharmacy.    She should continue using Dulera twice a day and Singulair at night.    She should definitely continue using Nasalide twice a day on a regular basis.    I reviewed her PFT results and discussed them with her today.  The PFT reveals no obstruction.  She has a preserved diffusion capacity.  There is no air trapping suggested.    She should definitely continue using AutoPap at the current pressure of 6/16 every night.  She is compliant with AutoPap use at greater than 96%.    Dictated utilizing Dragon dictation.    This document was electronically signed by SHERYL Moseley  7, 2019  2:42 PM

## 2019-06-13 ENCOUNTER — DOCUMENTATION (OUTPATIENT)
Dept: BARIATRICS/WEIGHT MGMT | Facility: CLINIC | Age: 49
End: 2019-06-13

## 2019-06-13 ENCOUNTER — OFFICE VISIT (OUTPATIENT)
Dept: BARIATRICS/WEIGHT MGMT | Facility: CLINIC | Age: 49
End: 2019-06-13

## 2019-06-13 VITALS
RESPIRATION RATE: 18 BRPM | DIASTOLIC BLOOD PRESSURE: 78 MMHG | OXYGEN SATURATION: 99 % | HEIGHT: 63 IN | SYSTOLIC BLOOD PRESSURE: 122 MMHG | TEMPERATURE: 97.7 F | WEIGHT: 260.5 LBS | HEART RATE: 74 BPM | BODY MASS INDEX: 46.16 KG/M2

## 2019-06-13 DIAGNOSIS — E66.01 OBESITY, CLASS III, BMI 40-49.9 (MORBID OBESITY) (HCC): ICD-10-CM

## 2019-06-13 DIAGNOSIS — R53.83 FATIGUE, UNSPECIFIED TYPE: Primary | ICD-10-CM

## 2019-06-13 DIAGNOSIS — E78.5 HYPERLIPIDEMIA, UNSPECIFIED HYPERLIPIDEMIA TYPE: ICD-10-CM

## 2019-06-13 DIAGNOSIS — G47.33 OBSTRUCTIVE SLEEP APNEA SYNDROME: ICD-10-CM

## 2019-06-13 DIAGNOSIS — M19.90 OSTEOARTHRITIS, UNSPECIFIED OSTEOARTHRITIS TYPE, UNSPECIFIED SITE: ICD-10-CM

## 2019-06-13 DIAGNOSIS — J45.909 ASTHMA, UNSPECIFIED ASTHMA SEVERITY, UNSPECIFIED WHETHER COMPLICATED, UNSPECIFIED WHETHER PERSISTENT: ICD-10-CM

## 2019-06-13 DIAGNOSIS — R06.09 DYSPNEA ON EXERTION: ICD-10-CM

## 2019-06-13 DIAGNOSIS — K21.9 GASTROESOPHAGEAL REFLUX DISEASE, ESOPHAGITIS PRESENCE NOT SPECIFIED: ICD-10-CM

## 2019-06-13 PROCEDURE — 99205 OFFICE O/P NEW HI 60 MIN: CPT | Performed by: SURGERY

## 2019-06-13 RX ORDER — SODIUM CHLORIDE 9 MG/ML
150 INJECTION, SOLUTION INTRAVENOUS CONTINUOUS
Status: CANCELLED | OUTPATIENT
Start: 2019-06-13

## 2019-06-13 NOTE — H&P
[facility]  [address]  [facility] [id number] [phone]      Patient  Name:  Madalyn Hernandez  :  1970      Date of Visit: 2019      Chief Complaint:  weight gain; unable to maintain weight loss    History of Present Illness:  Madalyn Hernandez is a 48 y.o. female who presents today for evaluation, education and consultation regarding weight loss surgery. The patient is interested in sleeve gastrectomy.     Her cousin in [place] had had a gastric bypass many years ago that strictured.      Madalyn has been overweight for at least 20 years, has been 35 pounds or more overweight for at least 15 years, has been 100 pounds or more overweight for 5 or more years and started dieting at age 33.  The patient describes their eating habits as emotional eater, skipping meals, snacking.      Previous diet attempts include: Slim Fast, Dextrim, restricted calories.  The most weight Madalyn lost was 20 pounds on diet pills from [employer] but was only able to maintain that weight loss for 5-6 months.  Her maximum lifetime weight is 260 pounds.    As above, patient has been overweight for many years, with numerous failed dietary/weight loss attempts.  She now has obesity related comorbidities and as such has decided to pursue weight loss surgery.    All past medical, surgical, social and family history have been obtained and discussed as pertinent to bariatric surgery as below.     Referred by Dr. Bishop.  Has asthma, and in the past has received steroids courses 1-2x per year, but now maintained on good inhalers and no longer needs the oral steroids.    Takes mobic for osteoarthritis.     GERD.  Takes Zantac with control of symptoms.  Has never had an EGD.      Hyperlipidemia on statin.    Denies abdominal pain or nausea with greasy foods.  Still has gallbladder.    No personal or family hx of VTE, no hx of MI.    Past Medical History:   Diagnosis Date   • Asthma     has  never been hospitalized.  Takes steroids 1-2x per year for flares.     • Fatigue    • Fibromyalgia    • GERD (gastroesophageal reflux disease)    • Hyperlipidemia    • DIANNA (obstructive sleep apnea)     compliant with CPAP every night   • Osteoarthritis    • Ovarian cancer (CMS/HCC)     s/p full ary-bso in    • Rosacea    • Seasonal allergies    • Stress incontinence    • Yeast infection     recurrent, PRN Diflucan     Past Surgical History:   Procedure Laterality Date   •  SECTION     • EAR TUBES     • TOTAL ABDOMINAL HYSTERECTOMY WITH SALPINGO OOPHORECTOMY      for ovarian cancer       Allergies   Allergen Reactions   • Sulfa Antibiotics Hives       Current Outpatient Medications:   •  azelastine (ASTELIN) 0.1 % nasal spray, USE 1 SPRAY IN EACH NOSTRIL TWICE DAILY AS NEEDED AS DIRECTED FOR RHINITIS OR ALLERGIES, Disp: 30 mL, Rfl: 4  •  estradiol (MINIVELLE, VIVELLE-DOT) 0.05 MG/24HR patch, QUIN 1 PA EXT TO THE SKIN 2 TIMES A WK UTD, Disp: , Rfl: 2  •  fluconazole (DIFLUCAN) 150 MG tablet, , Disp: , Rfl: 0  •  flunisolide (NASALIDE) 25 MCG/ACT (0.025%) solution nasal spray, Inhale 1 spray Every 12 (Twelve) Hours., Disp: 1 bottle, Rfl: 5  •  meloxicam (MOBIC) 7.5 MG tablet, TK 1 T PO QD WF OR MILK, Disp: , Rfl: 5  •  metroNIDAZOLE (METROCREAM) 0.75 % cream, , Disp: , Rfl:   •  mometasone-formoterol (DULERA) 200-5 MCG/ACT inhaler, Inhale 2 puffs 2 (Two) Times a Day. Rinse mouth after each use, Disp: 13 g, Rfl: 5  •  montelukast (SINGULAIR) 10 MG tablet, Take 1 tablet by mouth Every Night., Disp: 30 tablet, Rfl: 4  •  oxybutynin XL (DITROPAN-XL) 5 MG 24 hr tablet, Take 5 mg by mouth Daily., Disp: , Rfl: 2  •  raNITIdine (ZANTAC) 150 MG tablet, Take 1 tablet by mouth 2 (Two) Times a Day., Disp: 60 tablet, Rfl: 1  •  simvastatin (ZOCOR) 10 MG tablet, TK 1 T PO D HS, Disp: , Rfl: 2  •  Tiotropium Bromide Monohydrate (SPIRIVA RESPIMAT) 1.25 MCG/ACT aerosol solution inhaler, Inhale 2 puffs Daily., Disp: 1  inhaler, Rfl: 5  •  VENTOLIN  (90 Base) MCG/ACT inhaler, Inhale 2 puffs Every 6 (Six) Hours As Needed for Wheezing or Shortness of Air., Disp: 1 inhaler, Rfl: 5    Social History     Socioeconomic History   • Marital status: Single     Spouse name: Not on file   • Number of children: Not on file   • Years of education: Not on file   • Highest education level: Not on file   Tobacco Use   • Smoking status: Never Smoker   • Smokeless tobacco: Never Used   Substance and Sexual Activity   • Alcohol use: No   • Drug use: No   • Sexual activity: Defer     Birth control/protection: Surgical   Social History Narrative    Disabled for fibromyalgia.  Lives with her adopted three year old son.       Family History   Problem Relation Age of Onset   • COPD Mother    • Bone cancer Father    • Diabetes Maternal Grandmother    • COPD Maternal Grandfather    • Diabetes Brother    • Thyroid cancer Brother    • Thyroid disease Brother    • Liver disease Brother    • COPD Maternal Aunt        Review of Systems   Constitutional: Positive for fatigue and unexpected weight gain. Negative for chills, diaphoresis, fever and unexpected weight loss.   HENT: Negative for congestion and facial swelling.    Eyes: Negative for blurred vision, double vision and discharge.   Respiratory: Negative for chest tightness, shortness of breath and stridor.    Cardiovascular: Negative for chest pain, palpitations and leg swelling.   Gastrointestinal: Negative for blood in stool.   Endocrine: Negative for polydipsia.   Genitourinary: Negative for hematuria.   Musculoskeletal: Positive for arthralgias.   Skin: Negative for color change.   Allergic/Immunologic: Negative for immunocompromised state.   Neurological: Negative for confusion.   Psychiatric/Behavioral: Negative for self-injury.        Physical Exam:  Vital Signs:  Weight: 118 kg (260 lb 8 oz)   Body mass index is 46.15 kg/m².  Temp: 97.7 °F (36.5 °C)   Heart Rate: 74   BP: 122/78     Physical  Exam   Constitutional: She is oriented to person, place, and time. She appears well-developed and well-nourished.   HENT:   Head: Normocephalic and atraumatic.   Nose: Nose normal.   Eyes: Conjunctivae and EOM are normal. Pupils are equal, round, and reactive to light.   Neck: Normal range of motion. Neck supple. Carotid bruit is not present. No tracheal deviation present. No thyromegaly present.   Cardiovascular: Normal rate, regular rhythm and normal heart sounds.   Pulmonary/Chest: Effort normal and breath sounds normal. No respiratory distress.   Abdominal: Soft. She exhibits no distension. There is no hepatosplenomegaly. There is no tenderness.   Lower midline incision   Musculoskeletal: Normal range of motion. She exhibits no edema or deformity.   Neurological: She is alert and oriented to person, place, and time. No cranial nerve deficit. Coordination normal.   Skin: Skin is warm and dry. No rash noted.   Many skin tags and moles   Psychiatric: She has a normal mood and affect. Her behavior is normal. Judgment and thought content normal.   Vitals reviewed.      There is no problem list on file for this patient.      Assessment:    Madalyn Hernandez is a 48 y.o. year old female with medically complicated obesity pursuing sleeve gastrectomy.    Weight loss surgery is deemed medically necessary given the following obesity related comorbidities including sleep apnea, dyslipidemia, osteoarthritis, fibromyalgia, GERD, asthma and urinary stress incontinence with current Weight: 118 kg (260 lb 8 oz) and Body mass index is 46.15 kg/m²..    She is a good candidate for weight loss surgery pending further evaluation.    Plan:  The consultation plan and program requirements were reviewed with the patient.  The patient has been advised that a letter of medical support must be obtained from her primary care physician or referring provider. A psychological evaluation will be arranged.  A nutritional evaluation will be  performed.  The patient was advised to start a high protein and low carbohydrate diet.  Necessary lifestyle modifications were discussed.  Instructions on how to access ANAND was given to the patient.  ANAND is an internet based educational video that explains the surgical procedure chosen and answers basic questions regarding that procedure.     Preoperative testing will include: CBC, CMP, Lipids, TSH, HgA1C, H.Pylori, Pulmonary Function Testing, CXR, EKG and EGD (GLENROY Gomez).    EGD with biopsy.  Pt instructed to adhere to NPO after midnight, full liquids for 24 hours before procedure.      The risks and benefits of the upper endoscopy were discussed with the patient in detail and all questions were answered.  Possibility of perforation, bleeding, aspiration, and anesthesia reaction were reviewed.  Patient agrees to proceed.          Preop clearances required prior to surgery will include Cardiac and Pulmonary.      Patient understands that bariatric surgery is not cosmetic surgery but rather a tool to help make a lifelong commitment to lifestyle changes including diet, exercise, behavior modifications, and healthy habits.  The patient has been educated on expected postoperative lifestyle changes, including commitment to high protein diet, vitamin regimen, and exercise program.  They are aware that support groups are encouraged for optimal weight loss results.        I spent 9 minutes discussion smoking cessation and/or avoidance of second-hand smoke.       MDM high:  Elective procedure with the following risk factors: morbid obesity, asthma, DIANNA  4+ chronic medical problems reviewed.        Nisreen Dwyer MD

## 2019-06-13 NOTE — H&P (VIEW-ONLY)
[facility]  [address]  [facility] [address] [phone]      Patient  Name:  Madalyn Hernandez  :  1970      Date of Visit: 2019      Chief Complaint:  weight gain; unable to maintain weight loss    History of Present Illness:  Madalyn Hernandez is a 48 y.o. female who presents today for evaluation, education and consultation regarding weight loss surgery. The patient is interested in sleeve gastrectomy.     Her cousin in [place] had had a gastric bypass many years ago that strictured.      Madalyn has been overweight for at least 20 years, has been 35 pounds or more overweight for at least 15 years, has been 100 pounds or more overweight for 5 or more years and started dieting at age 33.  The patient describes their eating habits as emotional eater, skipping meals, snacking.      Previous diet attempts include: Slim Fast, Dextrim, restricted calories.  The most weight Madalyn lost was 20 pounds on diet pills from [email or web address] but was only able to maintain that weight loss for 5-6 months.  Her maximum lifetime weight is 260 pounds.    As above, patient has been overweight for many years, with numerous failed dietary/weight loss attempts.  She now has obesity related comorbidities and as such has decided to pursue weight loss surgery.    All past medical, surgical, social and family history have been obtained and discussed as pertinent to bariatric surgery as below.     Referred by Dr. Bishop.  Has asthma, and in the past has received steroids courses 1-2x per year, but now maintained on good inhalers and no longer needs the oral steroids.    Takes mobic for osteoarthritis.     GERD.  Takes Zantac with control of symptoms.  Has never had an EGD.      Hyperlipidemia on statin.    Denies abdominal pain or nausea with greasy foods.  Still has gallbladder.    No personal or family hx of VTE, no hx of MI.    Past Medical History:   Diagnosis Date   • Asthma     has  never been hospitalized.  Takes steroids 1-2x per year for flares.     • Fatigue    • Fibromyalgia    • GERD (gastroesophageal reflux disease)    • Hyperlipidemia    • DIANNA (obstructive sleep apnea)     compliant with CPAP every night   • Osteoarthritis    • Ovarian cancer (CMS/HCC)     s/p full ary-bso in    • Rosacea    • Seasonal allergies    • Stress incontinence    • Yeast infection     recurrent, PRN Diflucan     Past Surgical History:   Procedure Laterality Date   •  SECTION     • EAR TUBES     • TOTAL ABDOMINAL HYSTERECTOMY WITH SALPINGO OOPHORECTOMY      for ovarian cancer       Allergies   Allergen Reactions   • Sulfa Antibiotics Hives       Current Outpatient Medications:   •  azelastine (ASTELIN) 0.1 % nasal spray, USE 1 SPRAY IN EACH NOSTRIL TWICE DAILY AS NEEDED AS DIRECTED FOR RHINITIS OR ALLERGIES, Disp: 30 mL, Rfl: 4  •  estradiol (MINIVELLE, VIVELLE-DOT) 0.05 MG/24HR patch, QUIN 1 PA EXT TO THE SKIN 2 TIMES A WK UTD, Disp: , Rfl: 2  •  fluconazole (DIFLUCAN) 150 MG tablet, , Disp: , Rfl: 0  •  flunisolide (NASALIDE) 25 MCG/ACT (0.025%) solution nasal spray, Inhale 1 spray Every 12 (Twelve) Hours., Disp: 1 bottle, Rfl: 5  •  meloxicam (MOBIC) 7.5 MG tablet, TK 1 T PO QD WF OR MILK, Disp: , Rfl: 5  •  metroNIDAZOLE (METROCREAM) 0.75 % cream, , Disp: , Rfl:   •  mometasone-formoterol (DULERA) 200-5 MCG/ACT inhaler, Inhale 2 puffs 2 (Two) Times a Day. Rinse mouth after each use, Disp: 13 g, Rfl: 5  •  montelukast (SINGULAIR) 10 MG tablet, Take 1 tablet by mouth Every Night., Disp: 30 tablet, Rfl: 4  •  oxybutynin XL (DITROPAN-XL) 5 MG 24 hr tablet, Take 5 mg by mouth Daily., Disp: , Rfl: 2  •  raNITIdine (ZANTAC) 150 MG tablet, Take 1 tablet by mouth 2 (Two) Times a Day., Disp: 60 tablet, Rfl: 1  •  simvastatin (ZOCOR) 10 MG tablet, TK 1 T PO D HS, Disp: , Rfl: 2  •  Tiotropium Bromide Monohydrate (SPIRIVA RESPIMAT) 1.25 MCG/ACT aerosol solution inhaler, Inhale 2 puffs Daily., Disp: 1  inhaler, Rfl: 5  •  VENTOLIN  (90 Base) MCG/ACT inhaler, Inhale 2 puffs Every 6 (Six) Hours As Needed for Wheezing or Shortness of Air., Disp: 1 inhaler, Rfl: 5    Social History     Socioeconomic History   • Marital status: Single     Spouse name: Not on file   • Number of children: Not on file   • Years of education: Not on file   • Highest education level: Not on file   Tobacco Use   • Smoking status: Never Smoker   • Smokeless tobacco: Never Used   Substance and Sexual Activity   • Alcohol use: No   • Drug use: No   • Sexual activity: Defer     Birth control/protection: Surgical   Social History Narrative    Disabled for fibromyalgia.  Lives with her adopted three year old son.       Family History   Problem Relation Age of Onset   • COPD Mother    • Bone cancer Father    • Diabetes Maternal Grandmother    • COPD Maternal Grandfather    • Diabetes Brother    • Thyroid cancer Brother    • Thyroid disease Brother    • Liver disease Brother    • COPD Maternal Aunt        Review of Systems   Constitutional: Positive for fatigue and unexpected weight gain. Negative for chills, diaphoresis, fever and unexpected weight loss.   HENT: Negative for congestion and facial swelling.    Eyes: Negative for blurred vision, double vision and discharge.   Respiratory: Negative for chest tightness, shortness of breath and stridor.    Cardiovascular: Negative for chest pain, palpitations and leg swelling.   Gastrointestinal: Negative for blood in stool.   Endocrine: Negative for polydipsia.   Genitourinary: Negative for hematuria.   Musculoskeletal: Positive for arthralgias.   Skin: Negative for color change.   Allergic/Immunologic: Negative for immunocompromised state.   Neurological: Negative for confusion.   Psychiatric/Behavioral: Negative for self-injury.        Physical Exam:  Vital Signs:  Weight: 118 kg (260 lb 8 oz)   Body mass index is 46.15 kg/m².  Temp: 97.7 °F (36.5 °C)   Heart Rate: 74   BP: 122/78     Physical  Exam   Constitutional: She is oriented to person, place, and time. She appears well-developed and well-nourished.   HENT:   Head: Normocephalic and atraumatic.   Nose: Nose normal.   Eyes: Conjunctivae and EOM are normal. Pupils are equal, round, and reactive to light.   Neck: Normal range of motion. Neck supple. Carotid bruit is not present. No tracheal deviation present. No thyromegaly present.   Cardiovascular: Normal rate, regular rhythm and normal heart sounds.   Pulmonary/Chest: Effort normal and breath sounds normal. No respiratory distress.   Abdominal: Soft. She exhibits no distension. There is no hepatosplenomegaly. There is no tenderness.   Lower midline incision   Musculoskeletal: Normal range of motion. She exhibits no edema or deformity.   Neurological: She is alert and oriented to person, place, and time. No cranial nerve deficit. Coordination normal.   Skin: Skin is warm and dry. No rash noted.   Many skin tags and moles   Psychiatric: She has a normal mood and affect. Her behavior is normal. Judgment and thought content normal.   Vitals reviewed.      There is no problem list on file for this patient.      Assessment:    Madalyn Hernandez is a 48 y.o. year old female with medically complicated obesity pursuing sleeve gastrectomy.    Weight loss surgery is deemed medically necessary given the following obesity related comorbidities including sleep apnea, dyslipidemia, osteoarthritis, fibromyalgia, GERD, asthma and urinary stress incontinence with current Weight: 118 kg (260 lb 8 oz) and Body mass index is 46.15 kg/m²..    She is a good candidate for weight loss surgery pending further evaluation.    Plan:  The consultation plan and program requirements were reviewed with the patient.  The patient has been advised that a letter of medical support must be obtained from her primary care physician or referring provider. A psychological evaluation will be arranged.  A nutritional evaluation will be  performed.  The patient was advised to start a high protein and low carbohydrate diet.  Necessary lifestyle modifications were discussed.  Instructions on how to access ANAND was given to the patient.  ANAND is an internet based educational video that explains the surgical procedure chosen and answers basic questions regarding that procedure.     Preoperative testing will include: CBC, CMP, Lipids, TSH, HgA1C, H.Pylori, Pulmonary Function Testing, CXR, EKG and EGD (GLENROY Gomez).    EGD with biopsy.  Pt instructed to adhere to NPO after midnight, full liquids for 24 hours before procedure.      The risks and benefits of the upper endoscopy were discussed with the patient in detail and all questions were answered.  Possibility of perforation, bleeding, aspiration, and anesthesia reaction were reviewed.  Patient agrees to proceed.          Preop clearances required prior to surgery will include Cardiac and Pulmonary.      Patient understands that bariatric surgery is not cosmetic surgery but rather a tool to help make a lifelong commitment to lifestyle changes including diet, exercise, behavior modifications, and healthy habits.  The patient has been educated on expected postoperative lifestyle changes, including commitment to high protein diet, vitamin regimen, and exercise program.  They are aware that support groups are encouraged for optimal weight loss results.        I spent 9 minutes discussion smoking cessation and/or avoidance of second-hand smoke.       MDM high:  Elective procedure with the following risk factors: morbid obesity, asthma, DIANNA  4+ chronic medical problems reviewed.        Nisreen Dwyer MD

## 2019-06-13 NOTE — PROGRESS NOTES
[facility] GROUP BARIATRIC SURGERY  [address]  [facility] [id number] [phone]      Patient  Name:  Madalyn Hernandez  :  1970      Date of Visit: 2019      Chief Complaint:  weight gain; unable to maintain weight loss    History of Present Illness:  Madalyn Hernandez is a 48 y.o. female who presents today for evaluation, education and consultation regarding weight loss surgery. The patient is interested in sleeve gastrectomy.     Her cousin in [place] had had a gastric bypass many years ago that strictured.      Madalyn has been overweight for at least 20 years, has been 35 pounds or more overweight for at least 15 years, has been 100 pounds or more overweight for 5 or more years and started dieting at age 33.  The patient describes their eating habits as emotional eater, skipping meals, snacking.      Previous diet attempts include: Slim Fast, Dextrim, restricted calories.  The most weight Madalyn lost was 20 pounds on diet pills from [employer] but was only able to maintain that weight loss for 5-6 months.  Her maximum lifetime weight is 260 pounds.    As above, patient has been overweight for many years, with numerous failed dietary/weight loss attempts.  She now has obesity related comorbidities and as such has decided to pursue weight loss surgery.    All past medical, surgical, social and family history have been obtained and discussed as pertinent to bariatric surgery as below.     Referred by Dr. Bishop.  Has asthma, and in the past has received steroids courses 1-2x per year, but now maintained on good inhalers and no longer needs the oral steroids.    Takes mobic for osteoarthritis.     GERD.  Takes Zantac with control of symptoms.  Has never had an EGD.      Hyperlipidemia on statin.    Denies abdominal pain or nausea with greasy foods.  Still has gallbladder.    No personal or family hx of VTE, no hx of MI.    Past Medical History:   Diagnosis Date   • Asthma     has  never been hospitalized.  Takes steroids 1-2x per year for flares.     • Fatigue    • Fibromyalgia    • GERD (gastroesophageal reflux disease)    • Hyperlipidemia    • DIANNA (obstructive sleep apnea)     compliant with CPAP every night   • Osteoarthritis    • Ovarian cancer (CMS/HCC)     s/p full ary-bso in    • Rosacea    • Seasonal allergies    • Stress incontinence    • Yeast infection     recurrent, PRN Diflucan     Past Surgical History:   Procedure Laterality Date   •  SECTION     • EAR TUBES     • TOTAL ABDOMINAL HYSTERECTOMY WITH SALPINGO OOPHORECTOMY      for ovarian cancer       Allergies   Allergen Reactions   • Sulfa Antibiotics Hives       Current Outpatient Medications:   •  azelastine (ASTELIN) 0.1 % nasal spray, USE 1 SPRAY IN EACH NOSTRIL TWICE DAILY AS NEEDED AS DIRECTED FOR RHINITIS OR ALLERGIES, Disp: 30 mL, Rfl: 4  •  estradiol (MINIVELLE, VIVELLE-DOT) 0.05 MG/24HR patch, QUIN 1 PA EXT TO THE SKIN 2 TIMES A WK UTD, Disp: , Rfl: 2  •  fluconazole (DIFLUCAN) 150 MG tablet, , Disp: , Rfl: 0  •  flunisolide (NASALIDE) 25 MCG/ACT (0.025%) solution nasal spray, Inhale 1 spray Every 12 (Twelve) Hours., Disp: 1 bottle, Rfl: 5  •  meloxicam (MOBIC) 7.5 MG tablet, TK 1 T PO QD WF OR MILK, Disp: , Rfl: 5  •  metroNIDAZOLE (METROCREAM) 0.75 % cream, , Disp: , Rfl:   •  mometasone-formoterol (DULERA) 200-5 MCG/ACT inhaler, Inhale 2 puffs 2 (Two) Times a Day. Rinse mouth after each use, Disp: 13 g, Rfl: 5  •  montelukast (SINGULAIR) 10 MG tablet, Take 1 tablet by mouth Every Night., Disp: 30 tablet, Rfl: 4  •  oxybutynin XL (DITROPAN-XL) 5 MG 24 hr tablet, Take 5 mg by mouth Daily., Disp: , Rfl: 2  •  raNITIdine (ZANTAC) 150 MG tablet, Take 1 tablet by mouth 2 (Two) Times a Day., Disp: 60 tablet, Rfl: 1  •  simvastatin (ZOCOR) 10 MG tablet, TK 1 T PO D HS, Disp: , Rfl: 2  •  Tiotropium Bromide Monohydrate (SPIRIVA RESPIMAT) 1.25 MCG/ACT aerosol solution inhaler, Inhale 2 puffs Daily., Disp: 1  inhaler, Rfl: 5  •  VENTOLIN  (90 Base) MCG/ACT inhaler, Inhale 2 puffs Every 6 (Six) Hours As Needed for Wheezing or Shortness of Air., Disp: 1 inhaler, Rfl: 5    Social History     Socioeconomic History   • Marital status: Single     Spouse name: Not on file   • Number of children: Not on file   • Years of education: Not on file   • Highest education level: Not on file   Tobacco Use   • Smoking status: Never Smoker   • Smokeless tobacco: Never Used   Substance and Sexual Activity   • Alcohol use: No   • Drug use: No   • Sexual activity: Defer     Birth control/protection: Surgical   Social History Narrative    Disabled for fibromyalgia.  Lives with her adopted three year old son.       Family History   Problem Relation Age of Onset   • COPD Mother    • Bone cancer Father    • Diabetes Maternal Grandmother    • COPD Maternal Grandfather    • Diabetes Brother    • Thyroid cancer Brother    • Thyroid disease Brother    • Liver disease Brother    • COPD Maternal Aunt        Review of Systems   Constitutional: Positive for fatigue and unexpected weight gain. Negative for chills, diaphoresis, fever and unexpected weight loss.   HENT: Negative for congestion and facial swelling.    Eyes: Negative for blurred vision, double vision and discharge.   Respiratory: Negative for chest tightness, shortness of breath and stridor.    Cardiovascular: Negative for chest pain, palpitations and leg swelling.   Gastrointestinal: Negative for blood in stool.   Endocrine: Negative for polydipsia.   Genitourinary: Negative for hematuria.   Musculoskeletal: Positive for arthralgias.   Skin: Negative for color change.   Allergic/Immunologic: Negative for immunocompromised state.   Neurological: Negative for confusion.   Psychiatric/Behavioral: Negative for self-injury.        Physical Exam:  Vital Signs:  Weight: 118 kg (260 lb 8 oz)   Body mass index is 46.15 kg/m².  Temp: 97.7 °F (36.5 °C)   Heart Rate: 74   BP: 122/78     Physical  Exam   Constitutional: She is oriented to person, place, and time. She appears well-developed and well-nourished.   HENT:   Head: Normocephalic and atraumatic.   Nose: Nose normal.   Eyes: Conjunctivae and EOM are normal. Pupils are equal, round, and reactive to light.   Neck: Normal range of motion. Neck supple. Carotid bruit is not present. No tracheal deviation present. No thyromegaly present.   Cardiovascular: Normal rate, regular rhythm and normal heart sounds.   Pulmonary/Chest: Effort normal and breath sounds normal. No respiratory distress.   Abdominal: Soft. She exhibits no distension. There is no hepatosplenomegaly. There is no tenderness.   Lower midline incision   Musculoskeletal: Normal range of motion. She exhibits no edema or deformity.   Neurological: She is alert and oriented to person, place, and time. No cranial nerve deficit. Coordination normal.   Skin: Skin is warm and dry. No rash noted.   Many skin tags and moles   Psychiatric: She has a normal mood and affect. Her behavior is normal. Judgment and thought content normal.   Vitals reviewed.      There is no problem list on file for this patient.      Assessment:    Madalyn Hernandez is a 48 y.o. year old female with medically complicated obesity pursuing sleeve gastrectomy.    Weight loss surgery is deemed medically necessary given the following obesity related comorbidities including sleep apnea, dyslipidemia, osteoarthritis, fibromyalgia, GERD, asthma and urinary stress incontinence with current Weight: 118 kg (260 lb 8 oz) and Body mass index is 46.15 kg/m²..    She is a good candidate for weight loss surgery pending further evaluation.    Plan:  The consultation plan and program requirements were reviewed with the patient.  The patient has been advised that a letter of medical support must be obtained from her primary care physician or referring provider. A psychological evaluation will be arranged.  A nutritional evaluation will be  performed.  The patient was advised to start a high protein and low carbohydrate diet.  Necessary lifestyle modifications were discussed.  Instructions on how to access ANAND was given to the patient.  ANAND is an internet based educational video that explains the surgical procedure chosen and answers basic questions regarding that procedure.     Preoperative testing will include: CBC, CMP, Lipids, TSH, HgA1C, H.Pylori, Pulmonary Function Testing, CXR, EKG and EGD (GLENROY Gomez).    EGD with biopsy.  Pt instructed to adhere to NPO after midnight, full liquids for 24 hours before procedure.      The risks and benefits of the upper endoscopy were discussed with the patient in detail and all questions were answered.  Possibility of perforation, bleeding, aspiration, and anesthesia reaction were reviewed.  Patient agrees to proceed.          Preop clearances required prior to surgery will include Cardiac and Pulmonary.      Patient understands that bariatric surgery is not cosmetic surgery but rather a tool to help make a lifelong commitment to lifestyle changes including diet, exercise, behavior modifications, and healthy habits.  The patient has been educated on expected postoperative lifestyle changes, including commitment to high protein diet, vitamin regimen, and exercise program.  They are aware that support groups are encouraged for optimal weight loss results.        I spent 9 minutes discussion smoking cessation and/or avoidance of second-hand smoke.       MDM high:  Elective procedure with the following risk factors: morbid obesity, asthma, DIANNA  4+ chronic medical problems reviewed.        Nisreen Dwyer MD

## 2019-06-14 PROBLEM — K21.9 GASTROESOPHAGEAL REFLUX DISEASE: Status: ACTIVE | Noted: 2019-06-14

## 2019-06-14 LAB — UREA BREATH TEST QL: POSITIVE

## 2019-06-14 RX ORDER — MULTIPLE VITAMINS W/ MINERALS TAB 9MG-400MCG
1 TAB ORAL DAILY
COMMUNITY

## 2019-06-14 RX ORDER — MELATONIN
1000 DAILY
COMMUNITY

## 2019-06-14 NOTE — PROGRESS NOTES
Weight Loss Surgery  Presurgical Nutrition Assessment     Madalyn Hernandez  2019  [id number]  [id number]  1970  female    Surgery desired: Sleeve Gastrectomy    Weight: 118 kg (260 lb 8 oz)   Body mass index is 46.15 kg/m².  Past Medical History:   Diagnosis Date   • Asthma     has never been hospitalized.  Takes steroids 1-2x per year for flares.     • Body piercing     ears   • Elevated cholesterol    • Fatigue    • Fibromyalgia    • Full dentures     Instructed no adhesives the DOS   • GERD (gastroesophageal reflux disease)    • History of bronchitis    • History of pneumonia    • Hyperlipidemia    • DIANNA (obstructive sleep apnea)     compliant with CPAP every night - instructed to bring in DOS   • Osteoarthritis    • Ovarian cancer (CMS/[facility])     s/p full ary-bso in    • Rosacea    • Seasonal allergies    • Stress incontinence    • Wears glasses    • Yeast infection     recurrent, PRN Diflucan     Past Surgical History:   Procedure Laterality Date   •  SECTION     • COLONOSCOPY     • EAR TUBES     • MOUTH SURGERY      full mouth extraction   • TOTAL ABDOMINAL HYSTERECTOMY WITH SALPINGO OOPHORECTOMY      for ovarian cancer     Allergies   Allergen Reactions   • Sulfa Antibiotics Hives       Current Outpatient Medications:   •  azelastine (ASTELIN) 0.1 % nasal spray, USE 1 SPRAY IN EACH NOSTRIL TWICE DAILY AS NEEDED AS DIRECTED FOR RHINITIS OR ALLERGIES, Disp: 30 mL, Rfl: 4  •  cholecalciferol (VITAMIN D3) 1000 units tablet, Take 1,000 Units by mouth Daily., Disp: , Rfl:   •  estradiol (MINIVELLE, VIVELLE-DOT) 0.05 MG/24HR patch, QUIN 1 PA EXT TO THE SKIN 2 TIMES A WK UTD, Disp: , Rfl: 2  •  fluconazole (DIFLUCAN) 150 MG tablet, Take 150 mg by mouth Take As Directed. To take as instructed for onset of a yeast infection, Disp: , Rfl: 0  •  flunisolide (NASALIDE) 25 MCG/ACT (0.025%) solution nasal spray, Inhale 1 spray Every 12 (Twelve) Hours., Disp: 1 bottle, Rfl: 5  •  meloxicam (MOBIC) 7.5  MG tablet, TK 1 T PO QD WF OR MILK, Disp: , Rfl: 5  •  metroNIDAZOLE (METROCREAM) 0.75 % cream, Apply 1 application topically to the appropriate area as directed 2 (Two) Times a Day., Disp: , Rfl:   •  mometasone-formoterol (DULERA) 200-5 MCG/ACT inhaler, Inhale 2 puffs 2 (Two) Times a Day. Rinse mouth after each use, Disp: 13 g, Rfl: 5  •  montelukast (SINGULAIR) 10 MG tablet, Take 1 tablet by mouth Every Night., Disp: 30 tablet, Rfl: 4  •  Multiple Vitamins-Minerals (MULTIVITAMIN WITH MINERALS) tablet tablet, Take 1 tablet by mouth Daily. (With iron), Disp: , Rfl:   •  oxybutynin XL (DITROPAN-XL) 5 MG 24 hr tablet, Take 5 mg by mouth Daily., Disp: , Rfl: 2  •  raNITIdine (ZANTAC) 150 MG tablet, Take 1 tablet by mouth 2 (Two) Times a Day., Disp: 60 tablet, Rfl: 1  •  simvastatin (ZOCOR) 10 MG tablet, TK 1 T PO D HS, Disp: , Rfl: 2  •  Tiotropium Bromide Monohydrate (SPIRIVA RESPIMAT) 1.25 MCG/ACT aerosol solution inhaler, Inhale 2 puffs Daily., Disp: 1 inhaler, Rfl: 5  •  VENTOLIN  (90 Base) MCG/ACT inhaler, Inhale 2 puffs Every 6 (Six) Hours As Needed for Wheezing or Shortness of Air., Disp: 1 inhaler, Rfl: 5      Nutrition Assessment    Estimated energy needs: 2100    Estimated calories for weight loss: 1400    IBW (Pounds):  150      Excess body weight (Pounds): 110       Nutrition Recall  24 Hour recall: (B) (L) (D) -  Reviewed and discussed with patient  2 Pepsi per day  3 cups coffee with sugar     Exercise  rarely      Education    Provided manual:    Sleeve Gastrectomy    Provided follow-up options for support, including contact information for dietitians here, if desired.  Web-based support information and apps for smart phones and computers given.  Noted that monthly support group is offered at this clinic, and that support is associated with weight loss.    Recommend that team proceed with surgery and follow per protocol.      Nutrition Goals   Dietary Guidelines per manual  Protein goal:   grams per day   Eliminate soda    Exercise Goals  Add 15-30 minutes of activity per day as tolerated        Alycia Larios, KELBY  06/13/2019  12:35 PM

## 2019-06-18 ENCOUNTER — HOSPITAL ENCOUNTER (OUTPATIENT)
Facility: HOSPITAL | Age: 49
Setting detail: HOSPITAL OUTPATIENT SURGERY
Discharge: HOME | End: 2019-06-18
Attending: SURGERY | Admitting: SURGERY

## 2019-06-18 ENCOUNTER — ANESTHESIA (OUTPATIENT)
Dept: GASTROENTEROLOGY | Facility: HOSPITAL | Age: 49
End: 2019-06-18

## 2019-06-18 ENCOUNTER — ANESTHESIA EVENT (OUTPATIENT)
Dept: GASTROENTEROLOGY | Facility: HOSPITAL | Age: 49
End: 2019-06-18

## 2019-06-18 VITALS
DIASTOLIC BLOOD PRESSURE: 65 MMHG | BODY MASS INDEX: 46.07 KG/M2 | TEMPERATURE: 97.4 F | WEIGHT: 260 LBS | HEIGHT: 63 IN | HEART RATE: 72 BPM | SYSTOLIC BLOOD PRESSURE: 123 MMHG | RESPIRATION RATE: 20 BRPM | OXYGEN SATURATION: 93 %

## 2019-06-18 DIAGNOSIS — K21.9 GASTROESOPHAGEAL REFLUX DISEASE, ESOPHAGITIS PRESENCE NOT SPECIFIED: ICD-10-CM

## 2019-06-18 LAB
ALBUMIN SERPL-MCNC: 3.8 G/DL (ref 3.5–5)
ALBUMIN/GLOB SERPL: 1.2 G/DL (ref 1–2)
ALP SERPL-CCNC: 80 U/L (ref 38–126)
ALT SERPL W P-5'-P-CCNC: 35 U/L (ref 13–69)
ANION GAP SERPL CALCULATED.3IONS-SCNC: 13.7 MMOL/L (ref 10–20)
AST SERPL-CCNC: 19 U/L (ref 15–46)
BASOPHILS # BLD AUTO: 0.06 10*3/MM3 (ref 0–0.2)
BASOPHILS NFR BLD AUTO: 0.6 % (ref 0–1.5)
BILIRUB SERPL-MCNC: 0.5 MG/DL (ref 0.2–1.3)
BUN BLD-MCNC: 13 MG/DL (ref 7–20)
BUN/CREAT SERPL: 26 (ref 7.1–23.5)
CALCIUM SPEC-SCNC: 8.4 MG/DL (ref 8.4–10.2)
CHLORIDE SERPL-SCNC: 105 MMOL/L (ref 98–107)
CHOLEST SERPL-MCNC: 185 MG/DL (ref 0–199)
CO2 SERPL-SCNC: 24 MMOL/L (ref 26–30)
CREAT BLD-MCNC: 0.5 MG/DL (ref 0.6–1.3)
DEPRECATED RDW RBC AUTO: 43.2 FL (ref 37–54)
EOSINOPHIL # BLD AUTO: 0.32 10*3/MM3 (ref 0–0.4)
EOSINOPHIL NFR BLD AUTO: 3.2 % (ref 0.3–6.2)
ERYTHROCYTE [DISTWIDTH] IN BLOOD BY AUTOMATED COUNT: 12.8 % (ref 12.3–15.4)
GFR SERPL CREATININE-BSD FRML MDRD: 131 ML/MIN/1.73
GLOBULIN UR ELPH-MCNC: 3.2 GM/DL
GLUCOSE BLD-MCNC: 119 MG/DL (ref 74–98)
HBA1C MFR BLD: 6.4 % (ref 3–6)
HCT VFR BLD AUTO: 44 % (ref 34–46.6)
HDLC SERPL-MCNC: 42 MG/DL (ref 40–60)
HGB BLD-MCNC: 14.3 G/DL (ref 12–15.9)
IMM GRANULOCYTES # BLD AUTO: 0.07 10*3/MM3 (ref 0–0.05)
IMM GRANULOCYTES NFR BLD AUTO: 0.7 % (ref 0–0.5)
LDLC SERPL CALC-MCNC: 118 MG/DL (ref 0–99)
LDLC/HDLC SERPL: 2.8 {RATIO}
LYMPHOCYTES # BLD AUTO: 3.44 10*3/MM3 (ref 0.7–3.1)
LYMPHOCYTES NFR BLD AUTO: 34.4 % (ref 19.6–45.3)
MCH RBC QN AUTO: 29.9 PG (ref 26.6–33)
MCHC RBC AUTO-ENTMCNC: 32.5 G/DL (ref 31.5–35.7)
MCV RBC AUTO: 91.9 FL (ref 79–97)
MONOCYTES # BLD AUTO: 0.66 10*3/MM3 (ref 0.1–0.9)
MONOCYTES NFR BLD AUTO: 6.6 % (ref 5–12)
NEUTROPHILS # BLD AUTO: 5.44 10*3/MM3 (ref 1.7–7)
NEUTROPHILS NFR BLD AUTO: 54.5 % (ref 42.7–76)
NRBC BLD AUTO-RTO: 0 /100 WBC (ref 0–0.2)
PLATELET # BLD AUTO: 316 10*3/MM3 (ref 140–450)
PMV BLD AUTO: 10.4 FL (ref 6–12)
POTASSIUM BLD-SCNC: 3.7 MMOL/L (ref 3.5–5.1)
PROT SERPL-MCNC: 7 G/DL (ref 6.3–8.2)
RBC # BLD AUTO: 4.79 10*6/MM3 (ref 3.77–5.28)
SODIUM BLD-SCNC: 139 MMOL/L (ref 137–145)
TRIGL SERPL-MCNC: 126 MG/DL
TSH SERPL DL<=0.05 MIU/L-ACNC: 3.13 MIU/ML (ref 0.47–4.68)
VLDLC SERPL-MCNC: 25.2 MG/DL
WBC NRBC COR # BLD: 9.99 10*3/MM3 (ref 3.4–10.8)

## 2019-06-18 PROCEDURE — 80050 GENERAL HEALTH PANEL: CPT | Performed by: SURGERY

## 2019-06-18 PROCEDURE — 36415 COLL VENOUS BLD VENIPUNCTURE: CPT | Performed by: SURGERY

## 2019-06-18 PROCEDURE — 80061 LIPID PANEL: CPT | Performed by: SURGERY

## 2019-06-18 PROCEDURE — 25010000002 PROPOFOL 200 MG/20ML EMULSION: Performed by: NURSE ANESTHETIST, CERTIFIED REGISTERED

## 2019-06-18 PROCEDURE — 83036 HEMOGLOBIN GLYCOSYLATED A1C: CPT | Performed by: SURGERY

## 2019-06-18 PROCEDURE — 43239 EGD BIOPSY SINGLE/MULTIPLE: CPT | Performed by: SURGERY

## 2019-06-18 RX ORDER — PROPOFOL 10 MG/ML
INJECTION, EMULSION INTRAVENOUS AS NEEDED
Status: DISCONTINUED | OUTPATIENT
Start: 2019-06-18 | End: 2019-06-18 | Stop reason: SURG

## 2019-06-18 RX ORDER — OMEPRAZOLE 20 MG/1
20 CAPSULE, DELAYED RELEASE ORAL 2 TIMES DAILY
Qty: 20 CAPSULE | Refills: 0 | Status: SHIPPED | OUTPATIENT
Start: 2019-06-18 | End: 2019-06-28

## 2019-06-18 RX ORDER — PROMETHAZINE HYDROCHLORIDE 25 MG/ML
12.5 INJECTION, SOLUTION INTRAMUSCULAR; INTRAVENOUS ONCE AS NEEDED
Status: DISCONTINUED | OUTPATIENT
Start: 2019-06-18 | End: 2019-06-18 | Stop reason: HOSPADM

## 2019-06-18 RX ORDER — ONDANSETRON 2 MG/ML
4 INJECTION INTRAMUSCULAR; INTRAVENOUS ONCE AS NEEDED
Status: DISCONTINUED | OUTPATIENT
Start: 2019-06-18 | End: 2019-06-18 | Stop reason: HOSPADM

## 2019-06-18 RX ORDER — MAGNESIUM HYDROXIDE 1200 MG/15ML
LIQUID ORAL AS NEEDED
Status: DISCONTINUED | OUTPATIENT
Start: 2019-06-18 | End: 2019-06-18 | Stop reason: HOSPADM

## 2019-06-18 RX ORDER — SODIUM CHLORIDE 9 MG/ML
150 INJECTION, SOLUTION INTRAVENOUS CONTINUOUS
Status: DISCONTINUED | OUTPATIENT
Start: 2019-06-18 | End: 2019-06-18 | Stop reason: HOSPADM

## 2019-06-18 RX ORDER — AMOXICILLIN 500 MG/1
1000 CAPSULE ORAL 2 TIMES DAILY
Qty: 40 CAPSULE | Refills: 0 | Status: SHIPPED | OUTPATIENT
Start: 2019-06-18 | End: 2019-06-28

## 2019-06-18 RX ORDER — CLARITHROMYCIN 500 MG/1
500 TABLET, COATED ORAL 2 TIMES DAILY
Qty: 20 TABLET | Refills: 0 | Status: SHIPPED | OUTPATIENT
Start: 2019-06-18 | End: 2019-06-28

## 2019-06-18 RX ORDER — LIDOCAINE HYDROCHLORIDE 20 MG/ML
INJECTION, SOLUTION INTRAVENOUS AS NEEDED
Status: DISCONTINUED | OUTPATIENT
Start: 2019-06-18 | End: 2019-06-18 | Stop reason: SURG

## 2019-06-18 RX ADMIN — TOPICAL ANESTHETIC 1 SPRAY: 200 SPRAY DENTAL; PERIODONTAL at 07:36

## 2019-06-18 RX ADMIN — PROPOFOL 100 MG: 10 INJECTION, EMULSION INTRAVENOUS at 07:47

## 2019-06-18 RX ADMIN — PROPOFOL 50 MG: 10 INJECTION, EMULSION INTRAVENOUS at 07:52

## 2019-06-18 RX ADMIN — PROPOFOL 50 MG: 10 INJECTION, EMULSION INTRAVENOUS at 07:49

## 2019-06-18 RX ADMIN — SODIUM CHLORIDE 150 ML/HR: 9 INJECTION, SOLUTION INTRAVENOUS at 07:21

## 2019-06-18 RX ADMIN — SODIUM CHLORIDE 500 ML: 9 INJECTION, SOLUTION INTRAVENOUS at 06:57

## 2019-06-18 RX ADMIN — LIDOCAINE HYDROCHLORIDE 50 MG: 20 INJECTION, SOLUTION INTRAVENOUS at 07:45

## 2019-06-18 NOTE — OP NOTE
PROCEDURE NOTE.    Date: 06/18/19    Patient: Madalyn Hernandez     Surgeon: Nisreen Dwyer MD      Preoperative Diagnosis: GERD, H. Pylori infection     Postoperative Diagnosis: Antritis, tiny sliding hiatal hernia     Procedure Performed: Esophagogastroduodenoscopy with biopsy (CPT 08211)    Findings: Antritis that had the appearance of H. Pylori infection; Ge junction at 38 cm, tiny sliding hiatal hernia, slightly irregular Z line with irregularity <2 cm in length.     Specimens: Distal esophageal (37 cm)and antral biopsies     Indications: Madalyn Hernandez is a 48 y.o. year old supermorbidly obese female who is preparing for sleeve gastrectomy.  She has GERD, and also recently tested positive for H. Pylori.  She is here today for diagnostic endoscopy.     Procedure:      After informed consent was taken, the patient was brought to the operating room and placed in the supine position. MAC was given by anesthesia staff. A bite block was placed and time out performed. A flexible endoscope was passed transorally down to the second portion of the duodenum without difficulty. The scope was slowly withdrawn and the anatomy examined with photodocumentation throughout. The duodenum was normal. The pylorus was without spasm. The antrum of the stomach was significant for gastritis that had the appearance of an H. Pylori infection. A biopsy was taken to rule out H. Pylori. The scope was retroflexed and the body, fundus, and cardia were examined. There was no abnormality and no hiatal hernia seen from this angle. The scope was withdrawn back into the esophagus after decompressing the stomach. The GE junction was at 38 cm and the distal esophagus showed no gross evidence of reflux esophagitis, but the Z line was slightly irregular, with the length of irregularity well under 2 cm in length. Biopsy was taken. There was a tiny sliding hiatal hernia. The scope was further withdrawn. There was no other esophageal abnormality.  The vocal cords were normal. The scope was withdrawn completely and the procedure concluded. The patient was awakened and taken to recovery in good condition.      Recommendations: We will discuss biopsy results at next office appointment.

## 2019-06-18 NOTE — ANESTHESIA PREPROCEDURE EVALUATION
Anesthesia Evaluation     Patient summary reviewed and Nursing notes reviewed   NPO Solid Status: > 8 hours  NPO Liquid Status: > 8 hours           Airway   Mallampati: I  TM distance: >3 FB  Neck ROM: full  No difficulty expected  Dental - normal exam   (+) lower dentures and upper dentures    Pulmonary - normal exam   (+) asthma, sleep apnea on CPAP,   Cardiovascular - normal exam  Exercise tolerance: good (4-7 METS)    (+) hyperlipidemia,       Neuro/Psych- negative ROS  GI/Hepatic/Renal/Endo    (+) morbid obesity, GERD,      Musculoskeletal     Abdominal   (+) obese,     Bowel sounds: normal.   Substance History - negative use     OB/GYN negative ob/gyn ROS         Other   (+) arthritis   history of cancer                  Anesthesia Plan    ASA 3     MAC     intravenous induction   Anesthetic plan, all risks, benefits, and alternatives have been provided, discussed and informed consent has been obtained with: patient.    Plan discussed with attending.

## 2019-06-18 NOTE — INTERVAL H&P NOTE
H&P reviewed. The patient was examined and there are no changes to the H&P.       She has GERD, and also recently tested positive for H. Pylori.  She is here today for diagnostic endoscopy.

## 2019-06-18 NOTE — ANESTHESIA POSTPROCEDURE EVALUATION
Patient: Madalyn Hernandez    Procedure Summary     Date:  06/18/19 Room / Location:  [facility] ENDOSCOPY 3 / [facility] ENDOSCOPY    Anesthesia Start:  0741 Anesthesia Stop:  0757    Procedure:  ESOPHAGOGASTRODUODENOSCOPY WITH BIOPSY (N/A Esophagus) Diagnosis:       Gastroesophageal reflux disease, esophagitis presence not specified      (Gastroesophageal reflux disease, esophagitis presence not specified [K21.9])    Surgeon:  Nisreen Dwyer MD Provider:  Nikos Martinez CRNA    Anesthesia Type:  MAC ASA Status:  3          Anesthesia Type: MAC  Last vitals  BP   123/65 (06/18/19 0839)   Temp   97.4 °F (36.3 °C) (06/18/19 0805)   Pulse   72 (06/18/19 0839)   Resp   20 (06/18/19 0839)     SpO2   93 % (06/18/19 0839)     Post Anesthesia Care and Evaluation    Patient location during evaluation: bedside  Patient participation: complete - patient participated  Level of consciousness: awake and alert  Pain score: 0  Pain management: satisfactory to patient  Airway patency: patent  Anesthetic complications: No anesthetic complications  PONV Status: none  Cardiovascular status: acceptable and hemodynamically stable  Respiratory status: acceptable  Hydration status: acceptable

## 2019-06-21 LAB
LAB AP CASE REPORT: NORMAL
PATH REPORT.FINAL DX SPEC: NORMAL

## 2019-06-25 RX ORDER — OMEPRAZOLE 20 MG/1
CAPSULE, DELAYED RELEASE ORAL
Qty: 20 CAPSULE | Refills: 0 | OUTPATIENT
Start: 2019-06-25

## 2019-07-03 NOTE — PROGRESS NOTES
[facility]  Consultation H&P  Madalyn Hernandez  1970  [address] [place] [address]     VISIT DATE:  19    PCP: Dmitri Pitts, DO  [address] [place], [place] [id number]    IDENTIFICATION: A 48 y.o. female     CC:  Chief Complaint   Patient presents with   • bariatric clearance     Consult       PROBLEM LIST:  1. HLD, on simvastatin  1. 2019   HDL 42   2. Pre-DM  1. 2019 A1c 6.4  3. Morbid obesity  4. DIANNA  1. Compliant w CPAP  5. Asthma  6. GERD  7. History of ovarian cancer  1. S/p TAHBSO   8. Fibromyalgia  9. Chronic fatigue  10. Seasonal allergic rhinitis  11. Surgical history:  1. Total hysterectomy  2. Full mouth extraction  3. Ear tubes  4.     Allergies  Allergies   Allergen Reactions   • Sulfa Antibiotics Hives       Current Medications    Current Outpatient Medications:   •  azelastine (ASTELIN) 0.1 % nasal spray, USE 1 SPRAY IN EACH NOSTRIL TWICE DAILY AS NEEDED AS DIRECTED FOR RHINITIS OR ALLERGIES, Disp: 30 mL, Rfl: 4  •  cholecalciferol (VITAMIN D3) 1000 units tablet, Take 1,000 Units by mouth Daily., Disp: , Rfl:   •  estradiol (MINIVELLE, VIVELLE-DOT) 0.05 MG/24HR patch, QUIN 1 PA EXT TO THE SKIN 2 TIMES A WK UTD, Disp: , Rfl: 2  •  fluconazole (DIFLUCAN) 150 MG tablet, Take 150 mg by mouth Take As Directed. To take as instructed for onset of a yeast infection, Disp: , Rfl: 0  •  flunisolide (NASALIDE) 25 MCG/ACT (0.025%) solution nasal spray, Inhale 1 spray Every 12 (Twelve) Hours., Disp: 1 bottle, Rfl: 5  •  meloxicam (MOBIC) 7.5 MG tablet, TK 1 T PO QD WF OR MILK, Disp: , Rfl: 5  •  metroNIDAZOLE (METROCREAM) 0.75 % cream, Apply 1 application topically to the appropriate area as directed 2 (Two) Times a Day., Disp: , Rfl:   •  mometasone-formoterol (DULERA) 200-5 MCG/ACT inhaler, Inhale 2 puffs 2 (Two) Times a Day. Rinse mouth after each use, Disp: 13 g, Rfl: 5  •  montelukast (SINGULAIR) 10 MG tablet,  Take 1 tablet by mouth Every Night., Disp: 30 tablet, Rfl: 4  •  Multiple Vitamins-Minerals (MULTIVITAMIN WITH MINERALS) tablet tablet, Take 1 tablet by mouth Daily. (With iron), Disp: , Rfl:   •  oxybutynin XL (DITROPAN-XL) 5 MG 24 hr tablet, Take 5 mg by mouth Daily., Disp: , Rfl: 2  •  raNITIdine (ZANTAC) 150 MG tablet, Take 1 tablet by mouth 2 (Two) Times a Day., Disp: 60 tablet, Rfl: 1  •  simvastatin (ZOCOR) 10 MG tablet, TK 1 T PO D HS, Disp: , Rfl: 2  •  Tiotropium Bromide Monohydrate (SPIRIVA RESPIMAT) 1.25 MCG/ACT aerosol solution inhaler, Inhale 2 puffs Daily., Disp: 1 inhaler, Rfl: 5  •  VENTOLIN  (90 Base) MCG/ACT inhaler, Inhale 2 puffs Every 6 (Six) Hours As Needed for Wheezing or Shortness of Air., Disp: 1 inhaler, Rfl: 5     History of Present Illness   HPI  Madalyn Hernandez is a 48 y.o. year old female with the above mentioned PMH who presents for consult from Dr. Nisreen Dwyer for evaluation of cardiac clearance for sleeve gastrectomy.    Pt does report persistent dyspnea w mild/moderate exertion. She does have asthma and follows w Dr. Bishop. Cold weather exacerbates her symptoms. She has not had any hospitalizations, and usually needs 1 course of PO steroids w illness in the winter. She states she has not had any cardiac evaluation. She started CPAP for DIANNA several months ago, and states she sleeps so much better. Does notice occasional palpitations.     BP is well controlled, she has never been on any htn rx. Is on simvastatin. Is prediabetic w recent a1c 6.4 and she was started on metformin Friday, reports she's had GI side effects.     Pt denies any chest pain, dyspnea at rest, orthopnea, PND, or claudication.     Pt denies history of CHF, DVT, PE, MI, CVA, TIA, or rheumatic fever.       ROS  Review of Systems   Constitution: Positive for malaise/fatigue.   Eyes: Positive for blurred vision.   Cardiovascular: Positive for leg swelling.   Respiratory: Positive for cough, shortness of  "breath, sleep disturbances due to breathing, snoring and wheezing.    All other systems reviewed and are negative.      SOCIAL HX  Social History     Socioeconomic History   • Marital status: Single     Spouse name: Not on file   • Number of children: Not on file   • Years of education: Not on file   • Highest education level: Not on file   Tobacco Use   • Smoking status: Never Smoker   • Smokeless tobacco: Never Used   Substance and Sexual Activity   • Alcohol use: No   • Drug use: No   • Sexual activity: Defer     Birth control/protection: Surgical   Social History Narrative    Disabled for fibromyalgia.  Lives with her adopted three year old son.         FAMILY HX  Family History   Problem Relation Age of Onset   • COPD Mother    • Bone cancer Father    • Diabetes Maternal Grandmother    • COPD Maternal Grandfather    • Diabetes Brother    • Thyroid cancer Brother    • Thyroid disease Brother    • Liver disease Brother    • COPD Maternal Aunt        Vitals:    07/08/19 1109   BP: 124/76   BP Location: Right arm   Patient Position: Sitting   Pulse: 68   SpO2: 95%   Weight: 119 kg (262 lb 6.4 oz)   Height: 160 cm (63\")       PHYSICAL EXAMINATION:  Physical Exam   Constitutional: She is oriented to person, place, and time. She appears well-developed and well-nourished. No distress.   obese   HENT:   Head: Normocephalic and atraumatic.   Right Ear: External ear normal.   Left Ear: External ear normal.   Nose: Nose normal.   Eyes: Conjunctivae and EOM are normal.   Neck: Neck supple. No hepatojugular reflux and no JVD present. Carotid bruit is not present. No thyromegaly present.   Cardiovascular: Normal rate, regular rhythm, S1 normal, S2 normal, normal heart sounds, intact distal pulses and normal pulses. Exam reveals no gallop, no distant heart sounds and no midsystolic click.   No murmur heard.  Pulses:       Radial pulses are 2+ on the right side, and 2+ on the left side.        Dorsalis pedis pulses are 2+ on " the right side, and 2+ on the left side.        Posterior tibial pulses are 2+ on the right side, and 2+ on the left side.   Pulmonary/Chest: Effort normal and breath sounds normal. No respiratory distress. She has no decreased breath sounds. She has no wheezes. She has no rhonchi. She has no rales.   Abdominal: Soft. Bowel sounds are normal. There is no hepatosplenomegaly. There is no tenderness.   Musculoskeletal: Normal range of motion. She exhibits no edema.   Neurological: She is alert and oriented to person, place, and time.   No focal deficits.   Skin: Skin is warm and dry. No erythema.   Psychiatric: She has a normal mood and affect. Thought content normal.   Nursing note and vitals reviewed.      Diagnostic Data:    ECG 12 Lead  Date/Time: 7/8/2019 11:17 AM  Performed by: Esdras Delvalle MD  Authorized by: Esdras Delvalle MD   Comparison: not compared with previous ECG   Previous ECG: no previous ECG available  Rhythm: sinus rhythm  BPM: 67  Other findings: low voltage    Clinical impression: normal ECG          Lab Results   Component Value Date    TRIG 126 06/18/2019    HDL 42 06/18/2019     Lab Results   Component Value Date    GLUCOSE 119 (H) 06/18/2019    BUN 13 06/18/2019    CREATININE 0.50 (L) 06/18/2019     06/18/2019    K 3.7 06/18/2019     06/18/2019    CO2 24.0 (L) 06/18/2019     Lab Results   Component Value Date    HGBA1C 6.4 (H) 06/18/2019     Lab Results   Component Value Date    WBC 9.99 06/18/2019    HGB 14.3 06/18/2019    HCT 44.0 06/18/2019     06/18/2019       ASSESSMENT:   Diagnosis Plan   1. Preop cardiovascular exam  ECG 12 Lead   2. Mixed hyperlipidemia     3. Prediabetes     4. DIANNA (obstructive sleep apnea)     5. Palpitations  Adult Transthoracic Echo Complete W/ Cont if Necessary Per Protocol       PLAN:  1. With some symptoms and no prior eval, we will obtain echocardiogram prior to asses pt's cardiac risk for surgery.  2. Continue statin therapy  3. Pt  counseled on the increase in cardiac risk with diabetes.  Continue metformin.  4. Patient counseled on the many cardiac comp occasions of untreated DIANNA, continue CPAP.  Likely will improve with weight loss.      Scribed for Esdras Delvalle MD by Sera Serna PA-C. 7/8/2019  11:54 AM   Esdras Delvalle MD, FACC

## 2019-07-08 ENCOUNTER — HOSPITAL ENCOUNTER (OUTPATIENT)
Dept: CARDIOLOGY | Facility: HOSPITAL | Age: 49
Discharge: HOME | End: 2019-07-08
Admitting: PHYSICIAN ASSISTANT

## 2019-07-08 ENCOUNTER — CONSULT (OUTPATIENT)
Dept: CARDIOLOGY | Facility: CLINIC | Age: 49
End: 2019-07-08

## 2019-07-08 VITALS
HEIGHT: 63 IN | BODY MASS INDEX: 46.49 KG/M2 | SYSTOLIC BLOOD PRESSURE: 124 MMHG | DIASTOLIC BLOOD PRESSURE: 76 MMHG | OXYGEN SATURATION: 95 % | HEART RATE: 68 BPM | WEIGHT: 262.4 LBS

## 2019-07-08 VITALS — BODY MASS INDEX: 46.42 KG/M2 | HEIGHT: 63 IN | WEIGHT: 262 LBS

## 2019-07-08 DIAGNOSIS — E78.2 MIXED HYPERLIPIDEMIA: ICD-10-CM

## 2019-07-08 DIAGNOSIS — G47.33 OSA (OBSTRUCTIVE SLEEP APNEA): ICD-10-CM

## 2019-07-08 DIAGNOSIS — Z01.810 PREOP CARDIOVASCULAR EXAM: Primary | ICD-10-CM

## 2019-07-08 DIAGNOSIS — R00.2 PALPITATIONS: ICD-10-CM

## 2019-07-08 DIAGNOSIS — R73.03 PREDIABETES: ICD-10-CM

## 2019-07-08 PROCEDURE — 93000 ELECTROCARDIOGRAM COMPLETE: CPT | Performed by: INTERNAL MEDICINE

## 2019-07-08 PROCEDURE — 99244 OFF/OP CNSLTJ NEW/EST MOD 40: CPT | Performed by: INTERNAL MEDICINE

## 2019-07-08 PROCEDURE — 93306 TTE W/DOPPLER COMPLETE: CPT

## 2019-07-08 PROCEDURE — 93308 TTE F-UP OR LMTD: CPT | Performed by: INTERNAL MEDICINE

## 2019-07-09 LAB
BH CV ECHO MEAS - AO ROOT AREA (BSA CORRECTED): 1.3
BH CV ECHO MEAS - AO ROOT AREA: 6.2 CM^2
BH CV ECHO MEAS - AO ROOT DIAM: 2.8 CM
BH CV ECHO MEAS - BSA(HAYCOCK): 2.4 M^2
BH CV ECHO MEAS - BSA: 2.2 M^2
BH CV ECHO MEAS - BZI_BMI: 46.4 KILOGRAMS/M^2
BH CV ECHO MEAS - BZI_METRIC_HEIGHT: 160 CM
BH CV ECHO MEAS - BZI_METRIC_WEIGHT: 118.8 KG
BH CV ECHO MEAS - EDV(CUBED): 155.7 ML
BH CV ECHO MEAS - EDV(MOD-SP2): 83 ML
BH CV ECHO MEAS - EDV(MOD-SP4): 89 ML
BH CV ECHO MEAS - EDV(TEICH): 140.1 ML
BH CV ECHO MEAS - EF(CUBED): 79.3 %
BH CV ECHO MEAS - EF(MOD-BP): 64 %
BH CV ECHO MEAS - EF(MOD-SP2): 63.9 %
BH CV ECHO MEAS - EF(MOD-SP4): 64 %
BH CV ECHO MEAS - EF(TEICH): 71.2 %
BH CV ECHO MEAS - ESV(CUBED): 32.2 ML
BH CV ECHO MEAS - ESV(MOD-SP2): 30 ML
BH CV ECHO MEAS - ESV(MOD-SP4): 32 ML
BH CV ECHO MEAS - ESV(TEICH): 40.3 ML
BH CV ECHO MEAS - FS: 40.9 %
BH CV ECHO MEAS - IVS/LVPW: 1.1
BH CV ECHO MEAS - IVSD: 0.96 CM
BH CV ECHO MEAS - LAD MAJOR: 4.8 CM
BH CV ECHO MEAS - LAT PEAK E' VEL: 10.4 CM/SEC
BH CV ECHO MEAS - LATERAL E/E' RATIO: 11
BH CV ECHO MEAS - LV DIASTOLIC VOL/BSA (35-75): 41 ML/M^2
BH CV ECHO MEAS - LV MASS(C)D: 186.3 GRAMS
BH CV ECHO MEAS - LV MASS(C)DI: 85.9 GRAMS/M^2
BH CV ECHO MEAS - LV SYSTOLIC VOL/BSA (12-30): 14.8 ML/M^2
BH CV ECHO MEAS - LVIDD: 5.4 CM
BH CV ECHO MEAS - LVIDS: 3.2 CM
BH CV ECHO MEAS - LVLD AP2: 6.7 CM
BH CV ECHO MEAS - LVLD AP4: 6.7 CM
BH CV ECHO MEAS - LVLS AP2: 5.8 CM
BH CV ECHO MEAS - LVLS AP4: 5.6 CM
BH CV ECHO MEAS - LVPWD: 0.9 CM
BH CV ECHO MEAS - MED PEAK E' VEL: 7.1 CM/SEC
BH CV ECHO MEAS - MEDIAL E/E' RATIO: 16
BH CV ECHO MEAS - MV A MAX VEL: 108 CM/SEC
BH CV ECHO MEAS - MV DEC TIME: 0.18 SEC
BH CV ECHO MEAS - MV E MAX VEL: 114 CM/SEC
BH CV ECHO MEAS - MV E/A: 1.1
BH CV ECHO MEAS - PA ACC SLOPE: 616 CM/SEC^2
BH CV ECHO MEAS - PA ACC TIME: 0.1 SEC
BH CV ECHO MEAS - PA PR(ACCEL): 34.9 MMHG
BH CV ECHO MEAS - SI(CUBED): 57 ML/M^2
BH CV ECHO MEAS - SI(MOD-SP2): 24.4 ML/M^2
BH CV ECHO MEAS - SI(MOD-SP4): 26.3 ML/M^2
BH CV ECHO MEAS - SI(TEICH): 46 ML/M^2
BH CV ECHO MEAS - SV(CUBED): 123.6 ML
BH CV ECHO MEAS - SV(MOD-SP2): 53 ML
BH CV ECHO MEAS - SV(MOD-SP4): 57 ML
BH CV ECHO MEAS - SV(TEICH): 99.8 ML
BH CV ECHO MEAS - TAPSE (>1.6): 1.5 CM2
BH CV ECHO MEASUREMENTS AVERAGE E/E' RATIO: 13.03
BH CV VAS BP RIGHT ARM: NORMAL MMHG
BH CV XLRA - RV BASE: 3.3 CM
BH CV XLRA - RV LENGTH: 7.6 CM
BH CV XLRA - RV MID: 3 CM
BH CV XLRA - TDI S': 10.7 CM/SEC
LEFT ATRIUM VOLUME INDEX: 24 ML/M^2
LEFT ATRIUM VOLUME: 52 ML
LV EF 2D ECHO EST: 60 %
MAXIMAL PREDICTED HEART RATE: 170 BPM
STRESS TARGET HR: 145 BPM

## 2019-07-09 RX ORDER — AZELASTINE 1 MG/ML
SPRAY, METERED NASAL
Qty: 30 ML | Refills: 0 | Status: SHIPPED | OUTPATIENT
Start: 2019-07-09

## 2019-08-13 DIAGNOSIS — R10.13 DYSPEPSIA: Primary | ICD-10-CM

## 2019-09-10 ENCOUNTER — OFFICE VISIT (OUTPATIENT)
Dept: PULMONOLOGY | Facility: CLINIC | Age: 49
End: 2019-09-10

## 2019-09-10 VITALS
HEART RATE: 88 BPM | RESPIRATION RATE: 18 BRPM | WEIGHT: 251 LBS | SYSTOLIC BLOOD PRESSURE: 112 MMHG | DIASTOLIC BLOOD PRESSURE: 80 MMHG | BODY MASS INDEX: 44.47 KG/M2 | HEIGHT: 63 IN | OXYGEN SATURATION: 94 %

## 2019-09-10 DIAGNOSIS — E66.01 MORBID OBESITY, UNSPECIFIED OBESITY TYPE (HCC): ICD-10-CM

## 2019-09-10 DIAGNOSIS — G47.33 OSA (OBSTRUCTIVE SLEEP APNEA): ICD-10-CM

## 2019-09-10 DIAGNOSIS — J45.51 SEVERE PERSISTENT ASTHMA WITH ACUTE EXACERBATION: ICD-10-CM

## 2019-09-10 DIAGNOSIS — R06.02 SHORTNESS OF BREATH: Primary | ICD-10-CM

## 2019-09-10 DIAGNOSIS — J30.89 OTHER ALLERGIC RHINITIS: ICD-10-CM

## 2019-09-10 PROCEDURE — 95012 NITRIC OXIDE EXP GAS DETER: CPT | Performed by: INTERNAL MEDICINE

## 2019-09-10 PROCEDURE — 99214 OFFICE O/P EST MOD 30 MIN: CPT | Performed by: INTERNAL MEDICINE

## 2019-09-10 RX ORDER — PREDNISONE 10 MG/1
TABLET ORAL
COMMUNITY
Start: 2019-09-09 | End: 2019-09-10

## 2019-09-10 RX ORDER — PREDNISONE 10 MG/1
10 TABLET ORAL DAILY
Qty: 50 TABLET | Refills: 0 | Status: SHIPPED | OUTPATIENT
Start: 2019-09-10

## 2019-09-10 RX ORDER — AZITHROMYCIN 250 MG/1
TABLET, FILM COATED ORAL
COMMUNITY
Start: 2019-09-09

## 2019-09-10 NOTE — PROGRESS NOTES
"Chief Complaint   Patient presents with   • Follow-up   • Sleeping Problem   • Shortness of Breath         Subjective   Madalyn Hernandez is a 48 y.o. female.     History of Present Illness   Patient comes in today for follow up of asthma, allergic rhinitis, shortness of breath and DIANNA. Patient does report 2 recent exacerbations requiring urgent care visit.     She just finished 1 round of prednisone. She actually went back yesterday and was given ZPack and steroids again.    Patient is using the rescue inhalers minimally. she is compliant with pulmonary medicines, as prescribed.    Patient doesn't report any issues with the device. The patient describes no significant issues with her mask either.     Patient says that the compliance with the use of the equipment is good.     Patient says that her symptoms of fatigue & daytime sleepiness have been helped greatly with the use of PAP, as prescribed.     She was recently evaluated by Bariatric surgery program.     Patient says that she has been using her nasal sprays on a regular basis and describes no significant ongoing issues other than occasional congestion.       The following portions of the patient's history were reviewed and updated as appropriate: allergies, current medications, past family history, past medical history, past social history and past surgical history.    Review of Systems   Constitutional: Positive for fatigue. Negative for chills and fever.   HENT: Positive for rhinorrhea and sore throat. Negative for sinus pressure.    Respiratory: Positive for cough, chest tightness, shortness of breath and wheezing.    Cardiovascular: Positive for chest pain.   Psychiatric/Behavioral: Positive for sleep disturbance.       Objective   Visit Vitals  /80   Pulse 88   Resp 18   Ht 160 cm (62.99\")   Wt 114 kg (251 lb)   LMP  (LMP Unknown)   SpO2 94%   BMI 44.47 kg/m²       Physical Exam   Constitutional: She is oriented to person, place, and time. She appears " well-developed and well-nourished.   HENT:   Head: Atraumatic.   Crowded oropharynx.   Dentures.    Eyes: EOM are normal.   Neck: Normal range of motion. No JVD present. No thyromegaly present.   Cardiovascular: Normal rate.   Pulmonary/Chest: She is in respiratory distress. She has wheezes.   Musculoskeletal: Normal range of motion.   Gait was normal.   Neurological: She is alert and oriented to person, place, and time.   Skin: Skin is warm and dry.   Psychiatric: She has a normal mood and affect. Her behavior is normal.   Vitals reviewed.      Assessment/Plan   Madalyn was seen today for follow-up, sleeping problem and shortness of breath.    Diagnoses and all orders for this visit:    Shortness of breath  -     Nitric Oxide  -     Peak Flow    Severe persistent asthma with acute exacerbation  -     Nitric Oxide  -     Peak Flow    Other allergic rhinitis    DIANNA (obstructive sleep apnea)    Morbid obesity, unspecified obesity type (CMS/HCC)    Other orders  -     predniSONE (DELTASONE) 10 MG tablet; Take 1 tablet by mouth Daily. Take 4 tablets for 5 days THEN 3 tablets for 5 days THEN 2 tablets for 5 days THEN 1 tablet for 5 days.           Return in about 10 weeks (around 11/19/2019) for Recheck, For Rasheeda.    DISCUSSION (if any):  I also reviewed her last echocardiogram and shared the results with her.   Results for orders placed in visit on 07/08/19   Adult Transthoracic Echo Complete W/ Cont if Necessary Per Protocol    Narrative · Estimated EF = 60%.  · Left ventricular systolic function is normal.        ===========================  ===========================    FeNO level was 5 today.    Peak flow was 325 LPM today.    Laboratory workup was also reviewed which showed   Lab Results   Component Value Date    EOSABS 0.32 06/18/2019      Lab Results   Component Value Date    IGE 25 07/10/2018     ===========================  ===========================    For the symptoms of acute exacerbation of asthma, she  was prescribed oral steroids.    Side effects have been discussed in detail.    Patient was asked to report to the emergency room if symptoms do not improve.    All other medications will remain the same.    If she continues to have recurrent exacerbations, needs long term steroids, or symptoms suggest poorly controlled severe persistent asthma, combined with the fact that she has elevated absolute eosinophil levels, it is likely that she will benefit from benralizumab.    Side effects will be discussed in great detail, once this is being considered.     Patient was advised to continue her nasal spray, especially given improvement in symptoms overall.    Continue treatment with AutoPAP at a pressure of 6/16, with a full-face mask.    Patient seems to be compliant with PAP device, based on the available data and her account of improved symptoms.     Weight loss advised. Continue follow up with Bariatric surgery.     The patient was once again reminded to continue using the PAP device regularly, every night for atleast 4 hours.        Dictated utilizing Dragon dictation.    This document was electronically signed by Gianluca Bishop MD on 09/10/19 at 11:05 AM

## 2019-10-17 RX ORDER — ALBUTEROL SULFATE 90 UG/1
AEROSOL, METERED RESPIRATORY (INHALATION)
Qty: 18 G | Refills: 0 | Status: SHIPPED | OUTPATIENT
Start: 2019-10-17 | End: 2019-11-09 | Stop reason: SDUPTHER

## 2019-11-11 RX ORDER — ALBUTEROL SULFATE 90 UG/1
AEROSOL, METERED RESPIRATORY (INHALATION)
Qty: 18 G | Refills: 0 | Status: SHIPPED | OUTPATIENT
Start: 2019-11-11

## (undated) DEVICE — ENDOGATOR HYBRID TUBING KIT FOR USE WITH ENDOGATOR IRRIGATION PUMP, OLYMPUS PUMP, GI4000 ESU, AND TORRENT IRRIGATION PUMP.: Brand: ENDOGATOR KIT

## (undated) DEVICE — Device: Brand: ENDOGATOR

## (undated) DEVICE — FRCP BIOP COLD ENDOJAW ALLGTR W/NDL 2.8X2300MM BLU